# Patient Record
Sex: FEMALE | Race: WHITE | NOT HISPANIC OR LATINO | Employment: UNEMPLOYED | URBAN - METROPOLITAN AREA
[De-identification: names, ages, dates, MRNs, and addresses within clinical notes are randomized per-mention and may not be internally consistent; named-entity substitution may affect disease eponyms.]

---

## 2018-02-28 ENCOUNTER — HOSPITAL ENCOUNTER (EMERGENCY)
Facility: HOSPITAL | Age: 23
Discharge: HOME | End: 2018-02-28
Attending: EMERGENCY MEDICINE
Payer: MEDICAID

## 2018-02-28 VITALS
TEMPERATURE: 99 F | SYSTOLIC BLOOD PRESSURE: 122 MMHG | RESPIRATION RATE: 17 BRPM | HEIGHT: 68 IN | HEART RATE: 87 BPM | BODY MASS INDEX: 28.79 KG/M2 | DIASTOLIC BLOOD PRESSURE: 80 MMHG | OXYGEN SATURATION: 100 % | WEIGHT: 190 LBS

## 2018-02-28 DIAGNOSIS — L03.113 CELLULITIS OF RIGHT ELBOW: Primary | ICD-10-CM

## 2018-02-28 PROCEDURE — S0077 INJECTION, CLINDAMYCIN PHOSP: HCPCS | Performed by: NURSE PRACTITIONER

## 2018-02-28 PROCEDURE — 96372 THER/PROPH/DIAG INJ SC/IM: CPT

## 2018-02-28 PROCEDURE — 99283 EMERGENCY DEPT VISIT LOW MDM: CPT

## 2018-02-28 PROCEDURE — 25000003 PHARM REV CODE 250: Performed by: NURSE PRACTITIONER

## 2018-02-28 RX ORDER — CLINDAMYCIN PHOSPHATE 150 MG/ML
600 INJECTION, SOLUTION INTRAVENOUS
Status: COMPLETED | OUTPATIENT
Start: 2018-02-28 | End: 2018-02-28

## 2018-02-28 RX ORDER — CLINDAMYCIN HYDROCHLORIDE 300 MG/1
300 CAPSULE ORAL 4 TIMES DAILY
Qty: 28 CAPSULE | Refills: 0 | Status: SHIPPED | OUTPATIENT
Start: 2018-02-28 | End: 2018-03-07

## 2018-02-28 RX ADMIN — CLINDAMYCIN PHOSPHATE 600 MG: 150 INJECTION, SOLUTION INTRAMUSCULAR; INTRAVENOUS at 03:02

## 2018-02-28 NOTE — ED PROVIDER NOTES
SCRIBE #1 NOTE: I, Alan Shabazzsakshi Piper, am scribing for, and in the presence of, Dwaine Estrada NP. I have scribed the entire note.      History      Chief Complaint   Patient presents with    Cellulitis     R arm. States she needs treatment to go to rehab.        Review of patient's allergies indicates:  No Known Allergies     HPI   HPI    2/28/2018, 3:03 PM   History obtained from the patient      History of Present Illness: Neha Vázquez is a 22 y.o. female patient with a PMHx of MRSA, IVDA, who presents to the Emergency Department for right forearm pain and swelling which onset gradually 3 days ago. Sxs are constant, worsening, and moderate in severity. Pt reports sxs onset after using IV heroin 3 days ago. Pt reports pain radiates to right upper arm. Pt was seen at [facility] yesterday but had to leave hospital to pickup her kids before she could be treated. There are no mitigating or exacerbating factors noted. Associated sxs include chills.  Pt denies any fever, N/V/D, numbness, shoulder pain, diaphoresis, HA, CP,  and all other sxs at this time. Pt is a daily smoker. No further complaints or concerns at this time.       Arrival mode: Personal vehicle      PCP: [facility]       Past Medical History:  unknown    Past Surgical History:  unknown    Family History:  Family History   Problem Relation Age of Onset    Heart disease Maternal Grandfather        Social History:  Social History     Social History Main Topics    Smoking status: Never Smoker    Smokeless tobacco: unknown    Alcohol use No    Drug use: No    Sexual activity: Yes     Partners: Male       ROS   Review of Systems   Constitutional: Negative for fever.   HENT: Negative for sore throat.    Respiratory: Negative for shortness of breath.    Cardiovascular: Negative for chest pain.   Gastrointestinal: Negative for abdominal pain, diarrhea, nausea and vomiting.   Genitourinary: Negative for dysuria.   Musculoskeletal: Negative  "for back pain.        (+) R forearm swelling  (+) R forearm pain   Skin: Positive for color change (erythema to right forearm). Negative for rash and wound.   Neurological: Negative for weakness.   Hematological: Does not bruise/bleed easily.       Physical Exam      Initial Vitals [02/28/18 1401]   BP Pulse Resp Temp SpO2   129/65 95 18 98.8 °F (37.1 °C) 99 %      MAP       86.33          Physical Exam  Nursing Notes and Vital Signs Reviewed.  Constitutional: Patient is in no acute distress. Well-developed and well-nourished.  Head: Atraumatic. Normocephalic.  Eyes: PERRL. EOM intact. Conjunctivae are not pale. No scleral icterus.  ENT: Mucous membranes are moist. Oropharynx is clear and symmetric.    Neck: Supple. Full ROM. No lymphadenopathy.  Cardiovascular: Regular rate. Regular rhythm. No murmurs, rubs, or gallops. Distal pulses are 2+ and symmetric.  Pulmonary/Chest: No respiratory distress. Clear to auscultation bilaterally. No wheezing or rales.  Musculoskeletal: Moves all extremities. No calf tenderness.  RUE: 1-2 cm area of induration to ulnar aspect of AC with mild peripheral erythema.  ROM is normal in elbow. Cap refill distally is <2 seconds. Radial pulses are equal and 2+ bilaterally. No motor deficit. No distal sensory deficit.  Skin: Warm and dry.  Neurological:  Alert, awake, and appropriate.  Normal speech.  No acute focal neurological deficits are appreciated.  Psychiatric: Normal affect. Good eye contact. Appropriate in content.    ED Course    Procedures  ED Vital Signs:  Vitals:    02/28/18 1401   BP: 129/65   Pulse: 95   Resp: 18   Temp: 98.8 °F (37.1 °C)   TempSrc: Oral   SpO2: 99%   Weight: 86.2 kg (190 lb)   Height: 5' 8" (1.727 m)              The Emergency Provider reviewed the vital signs and test results, which are outlined above.    ED Discussion     3:14 PM: Discussed plan of treatment with pt. Gave pt all f/u and return to the ED instructions. All questions and concerns were " addressed at this time. Pt understands and agrees to plan as discussed. Pt is stable for discharge.     I discussed wound care precautions with patient and/or family/caretaker; specifically that all wounds have risk of infection despite efforts to cleanse and debride the wound; and there is a risk of an occult foreign body (and thus increased risk of infection) despite a negative examination.  I discussed with patient need to return for any signs of infection, specifically redness, increased pain, fever, drainage of pus, or any concern, immediately.    ED Medication(s):  Medications   clindamycin injection 600 mg (not administered)       New Prescriptions    CLINDAMYCIN (CLEOCIN) 300 MG CAPSULE    Take 1 capsule (300 mg total) by mouth 4 (four) times daily.       Follow-up Information     [facility]. Schedule an appointment as soon as possible for a visit in 2 days.                   Medical Decision Making        Additional MDM:   Smoking Cessation: The patient was counseled on tobacco related  health complications.        Scribe Attestation:   Scribe #1: I performed the above scribed service and the documentation accurately describes the services I performed. I attest to the accuracy of the note.    Attending:   Physician Attestation Statement for Scribe #1: I, Dwaine Estrada NP, personally performed the services described in this documentation, as scribed by Alan Piper, in my presence, and it is both accurate and complete.          Clinical Impression       ICD-10-CM ICD-9-CM   1. Cellulitis of right elbow L03.113 682.3       Disposition:   Disposition: Discharged  Condition: Stable         Dwaine Estrada NP  02/28/18 1517

## 2018-02-28 NOTE — ED NOTES
Pt to ER with c/o infection to R arm secondary to drug use. Pt with discrete erythema. No abscess noted. Pt states she would like to get clean and needs cellulitis treatment prior to entering rehab

## 2019-08-13 ENCOUNTER — HOSPITAL ENCOUNTER (EMERGENCY)
Facility: HOSPITAL | Age: 24
Discharge: HOME | End: 2019-08-13
Attending: EMERGENCY MEDICINE
Payer: MEDICAID

## 2019-08-13 VITALS
DIASTOLIC BLOOD PRESSURE: 96 MMHG | BODY MASS INDEX: 29.11 KG/M2 | RESPIRATION RATE: 20 BRPM | HEART RATE: 93 BPM | HEIGHT: 69 IN | TEMPERATURE: 99 F | SYSTOLIC BLOOD PRESSURE: 134 MMHG | WEIGHT: 196.56 LBS | OXYGEN SATURATION: 98 %

## 2019-08-13 DIAGNOSIS — K75.9 HEPATITIS: Primary | ICD-10-CM

## 2019-08-13 LAB
ALBUMIN SERPL BCP-MCNC: 3 G/DL (ref 3.5–5.2)
ALP SERPL-CCNC: 734 U/L (ref 55–135)
ALT SERPL W/O P-5'-P-CCNC: 656 U/L (ref 10–44)
ANION GAP SERPL CALC-SCNC: 9 MMOL/L (ref 8–16)
AST SERPL-CCNC: 284 U/L (ref 10–40)
B-HCG UR QL: NEGATIVE
BASOPHILS # BLD AUTO: 0.03 K/UL (ref 0–0.2)
BASOPHILS NFR BLD: 0.5 % (ref 0–1.9)
BILIRUB SERPL-MCNC: 3.8 MG/DL (ref 0.1–1)
BILIRUB UR QL STRIP: ABNORMAL
BUN SERPL-MCNC: 8 MG/DL (ref 6–20)
CALCIUM SERPL-MCNC: 9.1 MG/DL (ref 8.7–10.5)
CHLORIDE SERPL-SCNC: 106 MMOL/L (ref 95–110)
CLARITY UR: CLEAR
CO2 SERPL-SCNC: 23 MMOL/L (ref 23–29)
COLOR UR: YELLOW
CREAT SERPL-MCNC: 0.7 MG/DL (ref 0.5–1.4)
DACRYOCYTES BLD QL SMEAR: ABNORMAL
DIFFERENTIAL METHOD: ABNORMAL
EOSINOPHIL # BLD AUTO: 0.1 K/UL (ref 0–0.5)
EOSINOPHIL NFR BLD: 1.1 % (ref 0–8)
ERYTHROCYTE [DISTWIDTH] IN BLOOD BY AUTOMATED COUNT: 17.2 % (ref 11.5–14.5)
EST. GFR  (AFRICAN AMERICAN): >60 ML/MIN/1.73 M^2
EST. GFR  (NON AFRICAN AMERICAN): >60 ML/MIN/1.73 M^2
GLUCOSE SERPL-MCNC: 106 MG/DL (ref 70–110)
GLUCOSE UR QL STRIP: NEGATIVE
HCT VFR BLD AUTO: 40.1 % (ref 37–48.5)
HGB BLD-MCNC: 13.3 G/DL (ref 12–16)
HGB UR QL STRIP: NEGATIVE
INR PPP: 0.9 (ref 0.8–1.2)
KETONES UR QL STRIP: NEGATIVE
LEUKOCYTE ESTERASE UR QL STRIP: NEGATIVE
LYMPHOCYTES # BLD AUTO: 2.3 K/UL (ref 1–4.8)
LYMPHOCYTES NFR BLD: 36.2 % (ref 18–48)
MCH RBC QN AUTO: 26.9 PG (ref 27–31)
MCHC RBC AUTO-ENTMCNC: 33.2 G/DL (ref 32–36)
MCV RBC AUTO: 81 FL (ref 82–98)
MONOCYTES # BLD AUTO: 0.7 K/UL (ref 0.3–1)
MONOCYTES NFR BLD: 10.7 % (ref 4–15)
NEUTROPHILS # BLD AUTO: 3.3 K/UL (ref 1.8–7.7)
NEUTROPHILS NFR BLD: 52.6 % (ref 38–73)
NITRITE UR QL STRIP: NEGATIVE
PH UR STRIP: 7 [PH] (ref 5–8)
PLATELET # BLD AUTO: 216 K/UL (ref 150–350)
PMV BLD AUTO: 8.9 FL (ref 9.2–12.9)
POIKILOCYTOSIS BLD QL SMEAR: SLIGHT
POTASSIUM SERPL-SCNC: 3.7 MMOL/L (ref 3.5–5.1)
PROT SERPL-MCNC: 8.1 G/DL (ref 6–8.4)
PROT UR QL STRIP: ABNORMAL
PROTHROMBIN TIME: 10.1 SEC (ref 9–12.5)
RBC # BLD AUTO: 4.95 M/UL (ref 4–5.4)
SODIUM SERPL-SCNC: 138 MMOL/L (ref 136–145)
SP GR UR STRIP: 1.01 (ref 1–1.03)
STOMATOCYTES BLD QL SMEAR: PRESENT
TARGETS BLD QL SMEAR: ABNORMAL
URN SPEC COLLECT METH UR: ABNORMAL
UROBILINOGEN UR STRIP-ACNC: 1 EU/DL
WBC # BLD AUTO: 6.35 K/UL (ref 3.9–12.7)

## 2019-08-13 PROCEDURE — 85025 COMPLETE CBC W/AUTO DIFF WBC: CPT

## 2019-08-13 PROCEDURE — 80053 COMPREHEN METABOLIC PANEL: CPT

## 2019-08-13 PROCEDURE — 81003 URINALYSIS AUTO W/O SCOPE: CPT

## 2019-08-13 PROCEDURE — 81025 URINE PREGNANCY TEST: CPT

## 2019-08-13 PROCEDURE — 85610 PROTHROMBIN TIME: CPT

## 2019-08-13 PROCEDURE — 99283 EMERGENCY DEPT VISIT LOW MDM: CPT

## 2019-08-13 NOTE — ED PROVIDER NOTES
"Encounter Date: 8/13/2019       History     Chief Complaint   Patient presents with    Jaundice     Pt states, "I have Hepatitis C and my eyes are yellow and my pee is dark orange."     23 year old female with complaint of eyes turning yellow over the past few days. No vomiting.  No diarrhea. No fever or chills. No abdominal pain.  History of untreated HCV secondary to IV drug use.         Review of patient's allergies indicates:  No Known Allergies  Past Medical History:   Diagnosis Date    HCV (hepatitis C virus)     IV drug abuse      History reviewed. No pertinent surgical history.  Family History   Problem Relation Age of Onset    Heart disease Maternal Grandfather      Social History     Tobacco Use    Smoking status: Never Smoker    Smokeless tobacco: Never Used   Substance Use Topics    Alcohol use: No    Drug use: Yes     Review of Systems   Constitutional: Negative for fever.   HENT: Negative for sore throat.    Respiratory: Negative for shortness of breath.    Cardiovascular: Negative for chest pain.   Gastrointestinal: Negative for nausea.   Genitourinary: Negative for dysuria.   Musculoskeletal: Negative for back pain.   Skin: Positive for rash.   Neurological: Negative for weakness.   Hematological: Does not bruise/bleed easily.       Physical Exam     Initial Vitals [08/13/19 0937]   BP Pulse Resp Temp SpO2   (!) 134/96 93 20 98.7 °F (37.1 °C) 98 %      MAP       --         Physical Exam    Nursing note and vitals reviewed.  Constitutional: She appears well-developed and well-nourished.   HENT:   Head: Normocephalic and atraumatic.   Eyes: EOM are normal. Pupils are equal, round, and reactive to light.   Mild scleral icterus   Neck: Normal range of motion. Neck supple.   Cardiovascular: Normal rate, regular rhythm, normal heart sounds and intact distal pulses.   Pulmonary/Chest: Breath sounds normal.   Abdominal: Soft. There is no tenderness. There is no rebound and no guarding. "   Musculoskeletal: Normal range of motion.   Neurological: She is alert and oriented to person, place, and time. She has normal strength and normal reflexes.   Skin: Skin is warm and dry. Capillary refill takes less than 2 seconds.   Psychiatric: She has a normal mood and affect. Her behavior is normal. Thought content normal.         ED Course   Procedures  Labs Reviewed   CBC W/ AUTO DIFFERENTIAL - Abnormal; Notable for the following components:       Result Value    Mean Corpuscular Volume 81 (*)     Mean Corpuscular Hemoglobin 26.9 (*)     RDW 17.2 (*)     MPV 8.9 (*)     All other components within normal limits   COMPREHENSIVE METABOLIC PANEL - Abnormal; Notable for the following components:    Albumin 3.0 (*)     Total Bilirubin 3.8 (*)     Alkaline Phosphatase 734 (*)      (*)      (*)     All other components within normal limits   URINALYSIS - Abnormal; Notable for the following components:    Protein, UA Trace (*)     Bilirubin (UA) 2+ (*)     All other components within normal limits   PROTIME-INR   PREGNANCY TEST, URINE RAPID          Imaging Results    None              Labs Reviewed   CBC W/ AUTO DIFFERENTIAL - Abnormal; Notable for the following components:       Result Value    Mean Corpuscular Volume 81 (*)     Mean Corpuscular Hemoglobin 26.9 (*)     RDW 17.2 (*)     MPV 8.9 (*)     All other components within normal limits   COMPREHENSIVE METABOLIC PANEL - Abnormal; Notable for the following components:    Albumin 3.0 (*)     Total Bilirubin 3.8 (*)     Alkaline Phosphatase 734 (*)      (*)      (*)     All other components within normal limits   URINALYSIS - Abnormal; Notable for the following components:    Protein, UA Trace (*)     Bilirubin (UA) 2+ (*)     All other components within normal limits   PROTIME-INR   PREGNANCY TEST, URINE RAPID           11:20 AM  Pt feeling well, will have pt follow up with GI for further evaluation               Clinical Impression:        ICD-10-CM ICD-9-CM   1. Hepatitis K75.9 573.3                                Dwaine Estrada NP  08/13/19 1122       Dwaine Estrada NP  08/13/19 1122

## 2019-08-13 NOTE — ED NOTES
"Patient identifiers verified and correct for Neha Vázquez.    Patient presented to the ED with c/o dark colored urine for the last few days. Patient is hep c positive and a IV drug user. Patient reports that she got out of treatment 2 weeks ago, but has continued to use drugs. Patient reports that her urine started getting "brown" about a week ago. Patient denies any other issues at this time     LOC: The patient is awake, alert and aware of environment with an appropriate affect, the patient is oriented x 3 and speaking appropriately.  APPEARANCE: Patient resting comfortably and in no acute distress, patient is clean and well groomed, patient's clothing is properly fastened.  HEENT: WNL   SKIN: The skin is warm and dry, color consistent with ethnicity, patient has normal skin turgor and moist mucus membranes, skin intact, no breakdown or bruising noted.  MUSCULOSKELETAL: Patient moving all extremities spontaneously.   RESPIRATORY: Airway is open and patent, respirations are spontaneous, and unlabored. Breath sounds are clear.   CARDIAC: Patient has a normal rate, no periphreal edema noted, capillary refill < 3 seconds.   ABDOMEN: Soft and non tender to palpation. No distention noted.   : Normal urinary patterns. + dark "brown" urine     "

## 2019-11-05 ENCOUNTER — HOSPITAL ENCOUNTER (OUTPATIENT)
Facility: HOSPITAL | Age: 24
Discharge: HOME | End: 2019-11-08
Attending: EMERGENCY MEDICINE | Admitting: EMERGENCY MEDICINE
Payer: MEDICAID

## 2019-11-05 DIAGNOSIS — E87.20 METABOLIC ACIDOSIS: ICD-10-CM

## 2019-11-05 DIAGNOSIS — N17.9 AKI (ACUTE KIDNEY INJURY): ICD-10-CM

## 2019-11-05 DIAGNOSIS — Z71.89 ENCOUNTER FOR MEDICATION REVIEW AND COUNSELING: ICD-10-CM

## 2019-11-05 DIAGNOSIS — N20.1 URETEROLITHIASIS: Primary | ICD-10-CM

## 2019-11-05 DIAGNOSIS — N14.0 ANALGESIC NEPHROPATHY: ICD-10-CM

## 2019-11-05 DIAGNOSIS — N13.30 HYDRONEPHROSIS, UNSPECIFIED HYDRONEPHROSIS TYPE: ICD-10-CM

## 2019-11-05 PROBLEM — D72.829 LEUKOCYTOSIS: Status: ACTIVE | Noted: 2019-11-05

## 2019-11-05 LAB
ALBUMIN SERPL BCP-MCNC: 3.2 G/DL (ref 3.5–5.2)
ALP SERPL-CCNC: 66 U/L (ref 55–135)
ALT SERPL W/O P-5'-P-CCNC: 15 U/L (ref 10–44)
ANION GAP SERPL CALC-SCNC: 12 MMOL/L (ref 8–16)
AST SERPL-CCNC: 19 U/L (ref 10–40)
B-HCG UR QL: NEGATIVE
BACTERIA #/AREA URNS HPF: ABNORMAL /HPF
BASOPHILS # BLD AUTO: 0.04 K/UL (ref 0–0.2)
BASOPHILS NFR BLD: 0.3 % (ref 0–1.9)
BILIRUB SERPL-MCNC: 0.4 MG/DL (ref 0.1–1)
BILIRUB UR QL STRIP: NEGATIVE
BILIRUB UR QL STRIP: NEGATIVE
BUN SERPL-MCNC: 31 MG/DL (ref 6–20)
CALCIUM SERPL-MCNC: 8.9 MG/DL (ref 8.7–10.5)
CHLORIDE SERPL-SCNC: 105 MMOL/L (ref 95–110)
CLARITY UR: CLEAR
CLARITY UR: CLEAR
CO2 SERPL-SCNC: 20 MMOL/L (ref 23–29)
COLOR UR: YELLOW
COLOR UR: YELLOW
CREAT SERPL-MCNC: 2.4 MG/DL (ref 0.5–1.4)
CREAT UR-MCNC: 57.7 MG/DL (ref 15–325)
DIFFERENTIAL METHOD: ABNORMAL
EOSINOPHIL # BLD AUTO: 0.3 K/UL (ref 0–0.5)
EOSINOPHIL NFR BLD: 2 % (ref 0–8)
ERYTHROCYTE [DISTWIDTH] IN BLOOD BY AUTOMATED COUNT: 14.3 % (ref 11.5–14.5)
EST. GFR  (AFRICAN AMERICAN): 32 ML/MIN/1.73 M^2
EST. GFR  (NON AFRICAN AMERICAN): 28 ML/MIN/1.73 M^2
GLUCOSE SERPL-MCNC: 108 MG/DL (ref 70–110)
GLUCOSE UR QL STRIP: NEGATIVE
GLUCOSE UR QL STRIP: NEGATIVE
HCT VFR BLD AUTO: 37.5 % (ref 37–48.5)
HGB BLD-MCNC: 12.3 G/DL (ref 12–16)
HGB UR QL STRIP: ABNORMAL
HGB UR QL STRIP: ABNORMAL
HIV 1+2 AB+HIV1 P24 AG SERPL QL IA: NEGATIVE
IMM GRANULOCYTES # BLD AUTO: 0.06 K/UL (ref 0–0.04)
IMM GRANULOCYTES NFR BLD AUTO: 0.5 % (ref 0–0.5)
KETONES UR QL STRIP: ABNORMAL
KETONES UR QL STRIP: NEGATIVE
LEUKOCYTE ESTERASE UR QL STRIP: ABNORMAL
LEUKOCYTE ESTERASE UR QL STRIP: NEGATIVE
LYMPHOCYTES # BLD AUTO: 1.3 K/UL (ref 1–4.8)
LYMPHOCYTES NFR BLD: 10.1 % (ref 18–48)
MCH RBC QN AUTO: 27.9 PG (ref 27–31)
MCHC RBC AUTO-ENTMCNC: 32.8 G/DL (ref 32–36)
MCV RBC AUTO: 85 FL (ref 82–98)
MICROSCOPIC COMMENT: ABNORMAL
MICROSCOPIC COMMENT: ABNORMAL
MONOCYTES # BLD AUTO: 1 K/UL (ref 0.3–1)
MONOCYTES NFR BLD: 7.6 % (ref 4–15)
NEUTROPHILS # BLD AUTO: 10.2 K/UL (ref 1.8–7.7)
NEUTROPHILS NFR BLD: 79.5 % (ref 38–73)
NITRITE UR QL STRIP: NEGATIVE
NITRITE UR QL STRIP: NEGATIVE
NRBC BLD-RTO: 0 /100 WBC
PH UR STRIP: 6 [PH] (ref 5–8)
PH UR STRIP: 6 [PH] (ref 5–8)
PLATELET # BLD AUTO: 197 K/UL (ref 150–350)
PMV BLD AUTO: 9.1 FL (ref 9.2–12.9)
POTASSIUM SERPL-SCNC: 4.2 MMOL/L (ref 3.5–5.1)
PROT SERPL-MCNC: 7.4 G/DL (ref 6–8.4)
PROT UR QL STRIP: NEGATIVE
PROT UR QL STRIP: NEGATIVE
PTH-INTACT SERPL-MCNC: 95.2 PG/ML (ref 9–77)
RBC # BLD AUTO: 4.41 M/UL (ref 4–5.4)
RBC #/AREA URNS HPF: 11 /HPF (ref 0–4)
RBC #/AREA URNS HPF: 8 /HPF (ref 0–4)
SODIUM SERPL-SCNC: 137 MMOL/L (ref 136–145)
SODIUM UR-SCNC: 103 MMOL/L (ref 20–250)
SP GR UR STRIP: 1.01 (ref 1–1.03)
SP GR UR STRIP: 1.02 (ref 1–1.03)
SQUAMOUS #/AREA URNS HPF: 10 /HPF
SQUAMOUS #/AREA URNS HPF: 5 /HPF
URN SPEC COLLECT METH UR: ABNORMAL
URN SPEC COLLECT METH UR: ABNORMAL
UROBILINOGEN UR STRIP-ACNC: NEGATIVE EU/DL
UROBILINOGEN UR STRIP-ACNC: NEGATIVE EU/DL
WBC # BLD AUTO: 12.79 K/UL (ref 3.9–12.7)
WBC #/AREA URNS HPF: 6 /HPF (ref 0–5)
WBC #/AREA URNS HPF: 8 /HPF (ref 0–5)
WBC CLUMPS URNS QL MICRO: ABNORMAL

## 2019-11-05 PROCEDURE — 82570 ASSAY OF URINE CREATININE: CPT

## 2019-11-05 PROCEDURE — 87086 URINE CULTURE/COLONY COUNT: CPT

## 2019-11-05 PROCEDURE — 96361 HYDRATE IV INFUSION ADD-ON: CPT

## 2019-11-05 PROCEDURE — 80053 COMPREHEN METABOLIC PANEL: CPT

## 2019-11-05 PROCEDURE — 63600175 PHARM REV CODE 636 W HCPCS: Performed by: EMERGENCY MEDICINE

## 2019-11-05 PROCEDURE — 85025 COMPLETE CBC W/AUTO DIFF WBC: CPT

## 2019-11-05 PROCEDURE — G0378 HOSPITAL OBSERVATION PER HR: HCPCS

## 2019-11-05 PROCEDURE — 99291 CRITICAL CARE FIRST HOUR: CPT | Mod: 25

## 2019-11-05 PROCEDURE — 25000003 PHARM REV CODE 250: Performed by: INTERNAL MEDICINE

## 2019-11-05 PROCEDURE — 83970 ASSAY OF PARATHORMONE: CPT

## 2019-11-05 PROCEDURE — 96375 TX/PRO/DX INJ NEW DRUG ADDON: CPT

## 2019-11-05 PROCEDURE — 25000003 PHARM REV CODE 250: Performed by: EMERGENCY MEDICINE

## 2019-11-05 PROCEDURE — 99205 OFFICE O/P NEW HI 60 MIN: CPT | Mod: ,,, | Performed by: INTERNAL MEDICINE

## 2019-11-05 PROCEDURE — 81000 URINALYSIS NONAUTO W/SCOPE: CPT

## 2019-11-05 PROCEDURE — 99205 PR OFFICE/OUTPT VISIT, NEW, LEVL V, 60-74 MIN: ICD-10-PCS | Mod: ,,, | Performed by: INTERNAL MEDICINE

## 2019-11-05 PROCEDURE — 63600175 PHARM REV CODE 636 W HCPCS: Performed by: NURSE PRACTITIONER

## 2019-11-05 PROCEDURE — 81025 URINE PREGNANCY TEST: CPT

## 2019-11-05 PROCEDURE — 84300 ASSAY OF URINE SODIUM: CPT

## 2019-11-05 PROCEDURE — 86703 HIV-1/HIV-2 1 RESULT ANTBDY: CPT

## 2019-11-05 PROCEDURE — 96365 THER/PROPH/DIAG IV INF INIT: CPT

## 2019-11-05 PROCEDURE — 87040 BLOOD CULTURE FOR BACTERIA: CPT

## 2019-11-05 PROCEDURE — 81000 URINALYSIS NONAUTO W/SCOPE: CPT | Mod: 91

## 2019-11-05 RX ORDER — KETOROLAC TROMETHAMINE 10 MG/1
10 TABLET, FILM COATED ORAL EVERY 6 HOURS
Status: ON HOLD | COMMUNITY
End: 2019-11-08 | Stop reason: HOSPADM

## 2019-11-05 RX ORDER — SODIUM CHLORIDE 9 MG/ML
INJECTION, SOLUTION INTRAVENOUS CONTINUOUS
Status: DISCONTINUED | OUTPATIENT
Start: 2019-11-05 | End: 2019-11-08 | Stop reason: HOSPADM

## 2019-11-05 RX ORDER — ACETAMINOPHEN 500 MG
1000 TABLET ORAL EVERY 6 HOURS PRN
Status: DISCONTINUED | OUTPATIENT
Start: 2019-11-05 | End: 2019-11-08 | Stop reason: HOSPADM

## 2019-11-05 RX ORDER — HYDROCODONE BITARTRATE AND ACETAMINOPHEN 7.5; 325 MG/1; MG/1
1 TABLET ORAL EVERY 6 HOURS PRN
Status: DISCONTINUED | OUTPATIENT
Start: 2019-11-05 | End: 2019-11-05

## 2019-11-05 RX ORDER — ONDANSETRON 2 MG/ML
4 INJECTION INTRAMUSCULAR; INTRAVENOUS
Status: COMPLETED | OUTPATIENT
Start: 2019-11-05 | End: 2019-11-05

## 2019-11-05 RX ORDER — ACETAMINOPHEN 325 MG/1
650 TABLET ORAL
Status: COMPLETED | OUTPATIENT
Start: 2019-11-05 | End: 2019-11-05

## 2019-11-05 RX ORDER — HYDROCODONE BITARTRATE AND ACETAMINOPHEN 10; 325 MG/1; MG/1
1 TABLET ORAL EVERY 6 HOURS PRN
Status: DISCONTINUED | OUTPATIENT
Start: 2019-11-05 | End: 2019-11-05

## 2019-11-05 RX ORDER — NALTREXONE HYDROCHLORIDE 50 MG/1
50 TABLET, FILM COATED ORAL DAILY
Status: ON HOLD | COMMUNITY
End: 2020-08-06 | Stop reason: HOSPADM

## 2019-11-05 RX ORDER — TAMSULOSIN HYDROCHLORIDE 0.4 MG/1
0.4 CAPSULE ORAL DAILY
Status: DISCONTINUED | OUTPATIENT
Start: 2019-11-05 | End: 2019-11-08 | Stop reason: HOSPADM

## 2019-11-05 RX ORDER — ONDANSETRON 2 MG/ML
4 INJECTION INTRAMUSCULAR; INTRAVENOUS EVERY 8 HOURS PRN
Status: DISCONTINUED | OUTPATIENT
Start: 2019-11-05 | End: 2019-11-08 | Stop reason: HOSPADM

## 2019-11-05 RX ORDER — MORPHINE SULFATE 4 MG/ML
4 INJECTION, SOLUTION INTRAMUSCULAR; INTRAVENOUS
Status: DISCONTINUED | OUTPATIENT
Start: 2019-11-05 | End: 2019-11-05

## 2019-11-05 RX ADMIN — SODIUM CHLORIDE 1000 ML: 0.9 INJECTION, SOLUTION INTRAVENOUS at 10:11

## 2019-11-05 RX ADMIN — SODIUM CHLORIDE 1000 ML: 0.9 INJECTION, SOLUTION INTRAVENOUS at 12:11

## 2019-11-05 RX ADMIN — TAMSULOSIN HYDROCHLORIDE 0.4 MG: 0.4 CAPSULE ORAL at 02:11

## 2019-11-05 RX ADMIN — ACETAMINOPHEN 650 MG: 325 TABLET ORAL at 12:11

## 2019-11-05 RX ADMIN — CEFTRIAXONE 1 G: 1 INJECTION, SOLUTION INTRAVENOUS at 02:11

## 2019-11-05 RX ADMIN — SODIUM CHLORIDE: 0.9 INJECTION, SOLUTION INTRAVENOUS at 08:11

## 2019-11-05 RX ADMIN — SODIUM CHLORIDE: 0.9 INJECTION, SOLUTION INTRAVENOUS at 03:11

## 2019-11-05 RX ADMIN — ONDANSETRON 4 MG: 2 INJECTION INTRAMUSCULAR; INTRAVENOUS at 12:11

## 2019-11-05 NOTE — ASSESSMENT & PLAN NOTE
JAZMINE due to toradol use  Acute analgesic nephropathy  The kidney stone is unilateral and has no relation to JAZMINE  K normal  Metabolic acidosis, mild  JAZMINE should resolve with IVF's and after stopping toradol and with avoiding other NSAIDs.  Pt was advised about risk of kidney damage with NSAIds

## 2019-11-05 NOTE — ED TRIAGE NOTES
22 y/o F presents to ED c/o right flank pain, worsening.  Pt reports treated for same two days ago.

## 2019-11-05 NOTE — SUBJECTIVE & OBJECTIVE
Past Medical History:   Diagnosis Date    HCV (hepatitis C virus)     IV drug abuse        History reviewed. No pertinent surgical history.    Review of patient's allergies indicates:  No Known Allergies  Current Facility-Administered Medications   Medication Frequency    0.9%  NaCl infusion Continuous    cefTRIAXone (ROCEPHIN) 1 g in dextrose 5 % 50 mL IVPB ED 1 Time    tamsulosin 24 hr capsule 0.4 mg Daily     Current Outpatient Medications   Medication    ketorolac (TORADOL) 10 mg tablet    naltrexone (DEPADE) 50 mg tablet     Family History     Problem Relation (Age of Onset)    Heart disease Maternal Grandfather        Tobacco Use    Smoking status: Never Smoker    Smokeless tobacco: Never Used   Substance and Sexual Activity    Alcohol use: No    Drug use: Yes    Sexual activity: Yes     Partners: Male     Review of Systems   Constitutional: Negative for fever.   HENT: Negative.    Respiratory: Negative.    Cardiovascular: Negative.    Gastrointestinal: Negative.    Genitourinary: Positive for dysuria, flank pain and urgency.   Neurological: Negative.    Psychiatric/Behavioral: Negative.      Objective:     Vital Signs (Most Recent):  Temp: 99.1 °F (37.3 °C) (11/05/19 0928)  Pulse: 94 (11/05/19 1302)  Resp: 18 (11/05/19 1059)  BP: 106/61 (11/05/19 1302)  SpO2: 99 % (11/05/19 1302)  O2 Device (Oxygen Therapy): room air (11/05/19 1059) Vital Signs (24h Range):  Temp:  [99.1 °F (37.3 °C)] 99.1 °F (37.3 °C)  Pulse:  [92-98] 94  Resp:  [18-20] 18  SpO2:  [97 %-100 %] 99 %  BP: (102-149)/(58-76) 106/61     Weight: 105.2 kg (231 lb 14.8 oz) (11/05/19 0926)  Body mass index is 38.59 kg/m².  Body surface area is 2.2 meters squared.    No intake/output data recorded.    Physical Exam   Constitutional: She is oriented to person, place, and time. She appears well-developed and well-nourished. No distress.   HENT:   Head: Normocephalic and atraumatic.   Neck: No JVD present.   Cardiovascular: Normal rate,  regular rhythm and normal heart sounds. Exam reveals no friction rub.   Pulmonary/Chest: Effort normal and breath sounds normal. No stridor. No respiratory distress. She has no wheezes. She has no rales.   Abdominal: Soft. Bowel sounds are normal. She exhibits no distension. There is no tenderness. There is no guarding.   Musculoskeletal: She exhibits no edema.   Neurological: She is alert and oriented to person, place, and time.   Skin: Skin is warm and dry.   Psychiatric: She has a normal mood and affect. Her behavior is normal. Thought content normal.   Nursing note and vitals reviewed.      Significant Labs: reviewed  BMP  Lab Results   Component Value Date     11/05/2019    K 4.2 11/05/2019     11/05/2019    CO2 20 (L) 11/05/2019    BUN 31 (H) 11/05/2019    CREATININE 2.4 (H) 11/05/2019    CALCIUM 8.9 11/05/2019    ANIONGAP 12 11/05/2019    ESTGFRAFRICA 32 (A) 11/05/2019    EGFRNONAA 28 (A) 11/05/2019     Lab Results   Component Value Date    WBC 12.79 (H) 11/05/2019    HGB 12.3 11/05/2019    HCT 37.5 11/05/2019    MCV 85 11/05/2019     11/05/2019     U/a: 2+ blood, no protein, no nitrites, no leukoctes    Significant Imaging: reviewed CT abd: 5 x 5 mm right distal ureteral calculus resulting in mild right hydronephrosis.

## 2019-11-05 NOTE — ED NOTES
2x failed attempts at getting 2nd set of blood cultures.Patient took to radiology will try again after she comes back. Nurse notified.

## 2019-11-05 NOTE — HPI
Pt was seen and examined. Chart and h/o reviewed. Pt is a 22 y/o female who presented to ER for right sided flank pain. Pt has a h/o of nephrolithiasis x 5 years. Noted that she has a s Cr is 2.4, was normal 2 months ago. The stones have occurred in both left and right side. She previously saw a nephrologist and neglected to complete the w/u. She has never the stones, the stones have never been analyzed. Pt presented to [facility] ER 2 days ago for right sided pain, a stone was identified, per pt, in the right side. Pt was give IVF's, toradol injection, and was discharged home with a Rx for toradol tablets 10 mg bid, which she has taken in the past 2 days. The pain is in the right side, extending to the groin. Pt denies fever, no cloudy urine. No pain in left side. Pt has significant dietary risk factors for stone formation, including very salty diet, excessive meat intake, and not drinking adequate water. Discussed with urology.

## 2019-11-05 NOTE — CONSULTS
[facility]  Nephrology  Consult Note    Patient Name: Neha Vázquez  MRN: [id number]  Admission Date: 11/5/2019  Hospital Length of Stay: 0 days  Attending Provider: Rah Lopez,*   Primary Care Physician: [facility]  Principal Problem: nephrolithiasis and JAZMINE    Referring physician: Dr. Lopez   Reason for consult: JAZMINE and kidney stone    Consults  Subjective:     HPI: Pt was seen and examined. Chart and h/o reviewed. Pt is a 22 y/o female who presented to ER for right sided flank pain. Pt has a h/o of nephrolithiasis x 5 years. Noted that she has a s Cr is 2.4, was normal 2 months ago. The stones have occurred in both left and right side. She previously saw a nephrologist and neglected to complete the w/u. She has never the stones, the stones have never been analyzed. Pt presented to [facility] ER 2 days ago for right sided pain, a stone was identified, per pt, in the right side. Pt was give IVF's, toradol injection, and was discharged home with a Rx for toradol tablets 10 mg bid, which she has taken in the past 2 days. The pain is in the right side, extending to the groin. Pt denies fever, no cloudy urine. No pain in left side. Reports sx's of dysuria and urgency. Pt has significant dietary risk factors for stone formation, including very salty diet, excessive meat intake, and not drinking adequate water. Discussed with urology.     Past Medical History:   Diagnosis Date    HCV (hepatitis C virus)     IV drug abuse        History reviewed. No pertinent surgical history.    Review of patient's allergies indicates:  No Known Allergies  Current Facility-Administered Medications   Medication Frequency    0.9%  NaCl infusion Continuous    cefTRIAXone (ROCEPHIN) 1 g in dextrose 5 % 50 mL IVPB ED 1 Time    tamsulosin 24 hr capsule 0.4 mg Daily     Current Outpatient Medications   Medication    ketorolac (TORADOL) 10 mg tablet    naltrexone (DEPADE) 50 mg tablet      Family History     Problem Relation (Age of Onset)    Heart disease Maternal Grandfather        Tobacco Use    Smoking status: Never Smoker    Smokeless tobacco: Never Used   Substance and Sexual Activity    Alcohol use: No    Drug use: Yes    Sexual activity: Yes     Partners: Male     Review of Systems   Constitutional: Negative for fever.   HENT: Negative.    Respiratory: Negative.    Cardiovascular: Negative.    Gastrointestinal: Negative.    Genitourinary: Positive for dysuria, flank pain and urgency.   Neurological: Negative.    Psychiatric/Behavioral: Negative.      Objective:     Vital Signs (Most Recent):  Temp: 99.1 °F (37.3 °C) (11/05/19 0928)  Pulse: 94 (11/05/19 1302)  Resp: 18 (11/05/19 1059)  BP: 106/61 (11/05/19 1302)  SpO2: 99 % (11/05/19 1302)  O2 Device (Oxygen Therapy): room air (11/05/19 1059) Vital Signs (24h Range):  Temp:  [99.1 °F (37.3 °C)] 99.1 °F (37.3 °C)  Pulse:  [92-98] 94  Resp:  [18-20] 18  SpO2:  [97 %-100 %] 99 %  BP: (102-149)/(58-76) 106/61     Weight: 105.2 kg (231 lb 14.8 oz) (11/05/19 0926)  Body mass index is 38.59 kg/m².  Body surface area is 2.2 meters squared.    No intake/output data recorded.    Physical Exam   Constitutional: She is oriented to person, place, and time. She appears well-developed and well-nourished. No distress.   HENT:   Head: Normocephalic and atraumatic.   Neck: No JVD present.   Cardiovascular: Normal rate, regular rhythm and normal heart sounds. Exam reveals no friction rub.   Pulmonary/Chest: Effort normal and breath sounds normal. No stridor. No respiratory distress. She has no wheezes. She has no rales.   Abdominal: Soft. Bowel sounds are normal. She exhibits no distension. There is no tenderness. There is no guarding.   Musculoskeletal: She exhibits no edema.   Neurological: She is alert and oriented to person, place, and time.   Skin: Skin is warm and dry.   Psychiatric: She has a normal mood and affect. Her behavior is normal. Thought  content normal.   Nursing note and vitals reviewed.      Significant Labs: reviewed  BMP  Lab Results   Component Value Date     11/05/2019    K 4.2 11/05/2019     11/05/2019    CO2 20 (L) 11/05/2019    BUN 31 (H) 11/05/2019    CREATININE 2.4 (H) 11/05/2019    CALCIUM 8.9 11/05/2019    ANIONGAP 12 11/05/2019    ESTGFRAFRICA 32 (A) 11/05/2019    EGFRNONAA 28 (A) 11/05/2019     Lab Results   Component Value Date    WBC 12.79 (H) 11/05/2019    HGB 12.3 11/05/2019    HCT 37.5 11/05/2019    MCV 85 11/05/2019     11/05/2019     U/a: 2+ blood, no protein, no nitrites, no leukoctes    Significant Imaging: reviewed CT abd: 5 x 5 mm right distal ureteral calculus resulting in mild right hydronephrosis.      Assessment/Plan:   24 y/o female with JAZMINE and a kidney stone:    JAZMINE (acute kidney injury)  JAZMINE due to toradol use  Acute analgesic nephropathy  The kidney stone is unilateral and has no relation to JAZMINE  K normal  Metabolic acidosis, mild  JAZMINE should resolve with IVF's and after stopping toradol and with avoiding other NSAIDs.  Pt was advised about risk of kidney damage with NSAIds    Ureterolithiasis  5 year h/o of kidney stones  No prior w/u  Has dietary risk factors for both Ca oxalate and uric acid stones  No infections suspected, struvite stones not suspected  Has a 5 mm stone in the distal right ureter with mild hydronephrosis  Will order a KUB to differentiate between uric acid and Ca oxalate stones  Continue NS IVF's, received boluses in ER  Start flomax 0.4 mg po qd to dilate the ureter  Non-NSAIDs based pain relief (however, note that pt has a h/o of narcotic abuse and was in drug rehab)  Pt later can do a 24 hour urine collection for stone risk stratification.  Dietary risk factors for nephrolithiasis discussed with pt      Plans and recommendations:  As discussed above  Discussed with pt, opportunity for questions provided  Total time spent 70 minutes including time needed to review the  records, the   patient evaluation, documentation, face-to-face discussion with the patient,   more than 50% of the time was spent on coordination of care and counseling.    Level V visit.      Lizet Yin MD   Nephrology  [facility]

## 2019-11-05 NOTE — ASSESSMENT & PLAN NOTE
5 year h/o of kidney stones  No prior w/u  Has dietary risk factors for both Ca oxalate and uric acid stones  No infections suspected, struvite stones not suspected  Has a 5 mm stone in the distal right ureter with mild hydronephrosis  Will order a KUB to differentiate between uric acid and Ca oxalate stones  Continue NS IVF's, received boluses in ER  Start flomax 0.4 mg po qd to dilate the ureter  Non-NSAIDs based pain relief (however, note that pt has a h/o of narcotic abuse and was in drug rehab)  Pt later can do a 24 hour urine collection for stone risk stratification.  Dietary risk factors for nephrolithiasis discussed with pt

## 2019-11-05 NOTE — ASSESSMENT & PLAN NOTE
WBC 12.79 today in our ED.   At [facility] on 11/3 WBC = 12.8  U/A indicates RBC and + 1 leukocytes  Empiric Rocephin given and urine culture pending  Will hold IV ABX until results of urine culture

## 2019-11-05 NOTE — HPI
Neha Vázquez is a 23 y.o. female patient with a PMHx of Nephrolithiasis, HCV and IV drug abuse (she is in sober living house) who presents to the Emergency Department for evaluation of R sided flank pain which onset gradually several days ago with associated nausea.  She was seen at [facility] 2 nights ago for same symptoms and dx with kidney stone. While at [facility] ED she received IV Toradol and a prescription for Toradol. She presents due to continued pain. She denies fever, chills, vomiting, SOB, hematuria and other symptoms. Vital signs on arrival: normal. Labs find a mild leukocytosis, CO 2 = 20, creatinine 2.4 (11/3 - 1.01) with GFR 28. Renal stone study was significant for mild right hydroureteronephrosis secondary to a 5 x 5 mm right distal ureteral calculus.  Right renal edema and perinephric inflammatory stranding.  Interval development of diffuse left renal cortical scarring.  No evidence of left hydronephrosis.

## 2019-11-05 NOTE — ASSESSMENT & PLAN NOTE
Per Urology - no intervention needed at this time  Continue Flomax  IV fluids and force fluids when at home  Strain urine to monitor for stone  Follow up with Dr. Lam at [facility]

## 2019-11-05 NOTE — ED PROVIDER NOTES
"SCRIBE #1 NOTE: I, Sandrine Dwight, am scribing for, and in the presence of, Rah Lopez MD. I have scribed the entire note.       History     Chief Complaint   Patient presents with    Flank Pain     R flank pain for several days. Seen at [facility] 2 nights ago. Dx with kidney stone. Taking rx with no relief. "it's making my sciatic nerve mess up."      Review of patient's allergies indicates:  No Known Allergies      History of Present Illness     HPI    11/5/2019, 9:43 AM  History obtained from the patient      History of Present Illness: Neha Vázquez is a 23 y.o. female patient with a PMHx of HCV and IV drug abuse who presents to the Emergency Department for evaluation of R sided flank pain which onset gradually several days ago. She was seen at [facility] 2 nights ago for same symptoms and dx with kidney stone. Symptoms are constant and 5/10 in severity. Pain is exacerbated at night. No mitigating factors reported. No associated sxs. Patient denies any fever, chills, abd pain, dysuria, hematuria, urinary frequency/urgency, N/V and all other sxs at this time. No prior Tx. No further complaints or concerns at this time.         Arrival mode: Personal vehicle    PCP: unknown        Past Medical History:  Past Medical History:   Diagnosis Date    HCV (hepatitis C virus)     IV drug abuse        Past Surgical History:  History reviewed. No pertinent family history.       Family History:  Family History   Problem Relation Age of Onset    Heart disease Maternal Grandfather        Social History:  Social History     Tobacco Use    Smoking status: Never Smoker    Smokeless tobacco: Never Used   Substance and Sexual Activity    Alcohol use: No    Drug use: Yes    Sexual activity: Yes     Partners: Male        Review of Systems     Review of Systems   Constitutional: Negative for activity change, appetite change, chills, diaphoresis, fatigue and fever.   HENT: Negative for congestion, ear pain, " nosebleeds, rhinorrhea, sinus pain, sore throat and trouble swallowing.    Eyes: Negative for pain and discharge.   Respiratory: Negative for cough, chest tightness, shortness of breath, wheezing and stridor.    Cardiovascular: Negative for chest pain, palpitations and leg swelling.   Gastrointestinal: Negative for abdominal distention, abdominal pain, blood in stool, constipation, diarrhea, nausea and vomiting.   Genitourinary: Positive for flank pain (R sided). Negative for difficulty urinating, dysuria, frequency, hematuria and urgency.   Musculoskeletal: Negative for arthralgias, back pain, myalgias and neck pain.   Skin: Negative for pallor, rash and wound.   Neurological: Negative for dizziness, syncope, weakness, light-headedness, numbness and headaches.   Hematological: Does not bruise/bleed easily.   Psychiatric/Behavioral: Negative for confusion and self-injury.   All other systems reviewed and are negative.     Physical Exam     Initial Vitals   BP Pulse Resp Temp SpO2   11/05/19 0929 11/05/19 0928 11/05/19 0928 11/05/19 0928 11/05/19 0928   (!) 149/76 98 20 99.1 °F (37.3 °C) 97 %      MAP       --                 Physical Exam  Nursing Notes and Vital Signs Reviewed.  Constitutional: Patient is in no acute distress. Well-developed and well-nourished.  Head: Atraumatic. Normocephalic.  Eyes: PERRL. EOM intact. Conjunctivae are not pale. No scleral icterus.  ENT: Mucous membranes are moist. Oropharynx is clear and symmetric.    Neck: Supple. Full ROM. No lymphadenopathy.  Cardiovascular: Regular rate. Regular rhythm. No murmurs, rubs, or gallops. Distal pulses are 2+ and symmetric.  Pulmonary/Chest: No respiratory distress. Clear to auscultation bilaterally. No wheezing or rales.  Abdominal: Soft and non-distended.  There is no tenderness.  No rebound, guarding, or rigidity. Good bowel sounds.  Genitourinary: No CVA tenderness  Musculoskeletal: Moves all extremities. No obvious deformities. No edema. No  "calf tenderness.  Skin: Warm and dry.  Neurological:  Alert, awake, and appropriate.  Normal speech.  No acute focal neurological deficits are appreciated.  Psychiatric: Normal affect. Good eye contact. Appropriate in content.     ED Course   Critical Care  Date/Time: 11/5/2019 2:10 PM  Performed by: Rah Lopez MD  Authorized by: Rah Lopez MD   Direct patient critical care time: 22 minutes  Additional history critical care time: 9 minutes  Ordering / reviewing critical care time: 18 minutes  Documentation critical care time: 6 minutes  Consulting other physicians critical care time: 5 minutes  Consult with family critical care time: 0 minutes  Other critical care time: 0 minutes  Total critical care time (exclusive of procedural time) : 60 minutes  Critical care time was exclusive of separately billable procedures and treating other patients and teaching time.  Critical care was necessary to treat or prevent imminent or life-threatening deterioration of the following conditions: JAZMINE.  Critical care was time spent personally by me on the following activities: blood draw for specimens, development of treatment plan with patient or surrogate, discussions with consultants, interpretation of cardiac output measurements, examination of patient, evaluation of patient's response to treatment, obtaining history from patient or surrogate, ordering and performing treatments and interventions, ordering and review of radiographic studies, ordering and review of laboratory studies, pulse oximetry, re-evaluation of patient's condition and review of old charts.        ED Vital Signs:  Vitals:    11/05/19 0926 11/05/19 0928 11/05/19 0929 11/05/19 1059   BP:   (!) 149/76 (!) 102/58   Pulse:  98  92   Resp:  20  18   Temp:  99.1 °F (37.3 °C)     TempSrc:  Oral     SpO2:  97%  98%   Weight: 105.2 kg (231 lb 14.8 oz)      Height: 5' 5" (1.651 m)       11/05/19 1206   BP: 118/67   Pulse: 92   Resp:    Temp:  "   TempSrc:    SpO2: 100%   Weight:    Height:        Abnormal Lab Results:  Labs Reviewed   CBC W/ AUTO DIFFERENTIAL - Abnormal; Notable for the following components:       Result Value    WBC 12.79 (*)     MPV 9.1 (*)     Gran # (ANC) 10.2 (*)     Immature Grans (Abs) 0.06 (*)     Gran% 79.5 (*)     Lymph% 10.1 (*)     All other components within normal limits   COMPREHENSIVE METABOLIC PANEL - Abnormal; Notable for the following components:    CO2 20 (*)     BUN, Bld 31 (*)     Creatinine 2.4 (*)     Albumin 3.2 (*)     eGFR if  32 (*)     eGFR if non  28 (*)     All other components within normal limits   URINALYSIS, REFLEX TO URINE CULTURE - Abnormal; Notable for the following components:    Occult Blood UA 2+ (*)     All other components within normal limits    Narrative:     Preferred Collection Type->Urine, Clean Catch   URINALYSIS MICROSCOPIC - Abnormal; Notable for the following components:    RBC, UA 11 (*)     WBC, UA 8 (*)     All other components within normal limits    Narrative:     Preferred Collection Type->Urine, Clean Catch   CULTURE, URINE   CULTURE, BLOOD   CULTURE, BLOOD   HIV 1 / 2 ANTIBODY   PREGNANCY TEST, URINE RAPID   URINALYSIS   SODIUM, URINE, RANDOM   CREATININE, URINE, RANDOM   PTH, INTACT        All Lab Results:  Results for orders placed or performed during the hospital encounter of 11/05/19   HIV 1/2 Ag/Ab (4th Gen)   Result Value Ref Range    HIV 1/2 Ag/Ab Negative Negative   CBC auto differential   Result Value Ref Range    WBC 12.79 (H) 3.90 - 12.70 K/uL    RBC 4.41 4.00 - 5.40 M/uL    Hemoglobin 12.3 12.0 - 16.0 g/dL    Hematocrit 37.5 37.0 - 48.5 %    Mean Corpuscular Volume 85 82 - 98 fL    Mean Corpuscular Hemoglobin 27.9 27.0 - 31.0 pg    Mean Corpuscular Hemoglobin Conc 32.8 32.0 - 36.0 g/dL    RDW 14.3 11.5 - 14.5 %    Platelets 197 150 - 350 K/uL    MPV 9.1 (L) 9.2 - 12.9 fL    Immature Granulocytes 0.5 0.0 - 0.5 %    Gran # (ANC) 10.2 (H)  1.8 - 7.7 K/uL    Immature Grans (Abs) 0.06 (H) 0.00 - 0.04 K/uL    Lymph # 1.3 1.0 - 4.8 K/uL    Mono # 1.0 0.3 - 1.0 K/uL    Eos # 0.3 0.0 - 0.5 K/uL    Baso # 0.04 0.00 - 0.20 K/uL    nRBC 0 0 /100 WBC    Gran% 79.5 (H) 38.0 - 73.0 %    Lymph% 10.1 (L) 18.0 - 48.0 %    Mono% 7.6 4.0 - 15.0 %    Eosinophil% 2.0 0.0 - 8.0 %    Basophil% 0.3 0.0 - 1.9 %    Differential Method Automated    Comprehensive metabolic panel   Result Value Ref Range    Sodium 137 136 - 145 mmol/L    Potassium 4.2 3.5 - 5.1 mmol/L    Chloride 105 95 - 110 mmol/L    CO2 20 (L) 23 - 29 mmol/L    Glucose 108 70 - 110 mg/dL    BUN, Bld 31 (H) 6 - 20 mg/dL    Creatinine 2.4 (H) 0.5 - 1.4 mg/dL    Calcium 8.9 8.7 - 10.5 mg/dL    Total Protein 7.4 6.0 - 8.4 g/dL    Albumin 3.2 (L) 3.5 - 5.2 g/dL    Total Bilirubin 0.4 0.1 - 1.0 mg/dL    Alkaline Phosphatase 66 55 - 135 U/L    AST 19 10 - 40 U/L    ALT 15 10 - 44 U/L    Anion Gap 12 8 - 16 mmol/L    eGFR if African American 32 (A) >60 mL/min/1.73 m^2    eGFR if non African American 28 (A) >60 mL/min/1.73 m^2   Urinalysis, Reflex to Urine Culture Urine, Clean Catch   Result Value Ref Range    Specimen UA Urine, Clean Catch     Color, UA Yellow Yellow, Straw, Destiny    Appearance, UA Clear Clear    pH, UA 6.0 5.0 - 8.0    Specific Gravity, UA 1.020 1.005 - 1.030    Protein, UA Negative Negative    Glucose, UA Negative Negative    Ketones, UA Negative Negative    Bilirubin (UA) Negative Negative    Occult Blood UA 2+ (A) Negative    Nitrite, UA Negative Negative    Urobilinogen, UA Negative <2.0 EU/dL    Leukocytes, UA Negative Negative   Pregnancy, urine rapid   Result Value Ref Range    Preg Test, Ur Negative    Urinalysis Microscopic   Result Value Ref Range    RBC, UA 11 (H) 0 - 4 /hpf    WBC, UA 8 (H) 0 - 5 /hpf    WBC Clumps, UA Rare None-Rare    Squam Epithel, UA 10 /hpf    Microscopic Comment SEE COMMENT          Imaging Results:  Imaging Results          CT Renal Stone Study ABD Pelvis WO  (Final result)  Result time 11/05/19 10:46:55    Final result by Harsh Tomas MD (11/05/19 10:46:55)                 Impression:      5 x 5 mm right distal ureteral calculus resulting in mild right hydronephrosis.    All CT scans at this facility are performed  using dose modulation techniques as appropriate to performed exam including the following:  automated exposure control; adjustment of mA and/or kV according to the patients size (this includes techniques or standardized protocols for targeted exams where dose is matched to indication/reason for exam: i.e. extremities or head);  iterative reconstruction technique.      Electronically signed by: Harsh Tomas MD  Date:    11/05/2019  Time:    10:46             Narrative:    EXAMINATION:  CT RENAL STONE STUDY ABD PELVIS WO    CLINICAL HISTORY:  Flank pain, stone disease suspected;    TECHNIQUE:  Axial CT images performed through the abdomen and pelvis without intravenous contrast. Multiplanar reformats were performed and interpreted.    COMPARISON:  08/03/2016    FINDINGS:  Lung bases are clear.    Mild right hydroureteronephrosis secondary to a 5 x 5 mm right distal ureteral calculus.  Right renal edema and perinephric inflammatory stranding.  Interval development of diffuse left renal cortical scarring.  No evidence of left hydronephrosis.    The liver, spleen, kidneys, adrenal glands, and pancreas have a normal noncontrasted appearance.    The gallbladder is unremarkable.    No free fluid, free air, or inflammatory change.    The bowel is nondistended and within normal limits.  The appendix is within normal limits.    The abdominal aorta is normal in caliber.    The urinary bladder is unremarkable.    No significant osseous abnormality is identified.                                        The Emergency Provider reviewed the vital signs and test results, which are outlined above.     ED Discussion     10:55 AM: Dr. Lopez updated Dr. Rosales (urology) on pt's  case. He recommends Flomax.     1:15 PM: Dr. Lopez discussed the pt's case with Dr. Yin (nephrology) who recommends admit for observation due to low grade temp with 8 wbc on UA.    1:27 PM: Discussed case with CARLOS Lundy ([facility]). Dr. Brewer agrees with current care and management of pt and accepts admission.   Admitting Service: Hospital medicine   Admitting Physician: Daniella  Admit to: obs med surg    1:27 PM: Re-evaluated pt. I have discussed test results, shared treatment plan, and the need for admission with patient and family at bedside. Pt and family express understanding at this time and agree with all information. All questions answered. Pt and family have no further questions or concerns at this time. Pt is ready for admit.    1:30 PM: Dr. Yin evaluated pt at bedside. 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 She got Toradol injection at [facility] and Rx for it at well. Medication has not improved pain.          Medical Decision Making:   Clinical Tests:   Lab Tests: Ordered and Reviewed  Radiological Study: Ordered and Reviewed           ED Medication(s):  Medications   0.9%  NaCl infusion (has no administration in time range)   tamsulosin 24 hr capsule 0.4 mg (has no administration in time range)   cefTRIAXone (ROCEPHIN) 1 g in dextrose 5 % 50 mL IVPB (has no administration in time range)   sodium chloride 0.9% bolus 1,000 mL (0 mLs Intravenous Stopped 11/5/19 1327)   ondansetron injection 4 mg (4 mg Intravenous Given 11/5/19 1253)   sodium chloride 0.9% bolus 1,000 mL (1,000 mLs Intravenous New Bag 11/5/19 1253)   acetaminophen tablet 650 mg (650 mg Oral Given 11/5/19 1253)       Scribe Attestation:   Scribe #1: I performed the above scribed service and the documentation accurately describes the services I performed. I attest to the accuracy of the note.     Attending:   Physician Attestation Statement for Scribe #1: I, Rah Lopez MD,  personally performed the services described in this documentation, as scribed by Sandrine Quintanilla, in my presence, and it is both accurate and complete.           Clinical Impression       ICD-10-CM ICD-9-CM   1. Ureterolithiasis N20.1 592.1   2. JAZMINE (acute kidney injury) N17.9 584.9       Disposition:   Disposition: Placed in Observation  Condition: Fair         Rah Lopez MD  11/07/19 1938

## 2019-11-05 NOTE — ASSESSMENT & PLAN NOTE
Per Nephrology - acute analgesic nephropathy due to Toradol and NSAIDs  IV fluids  Avoid nephrotoxic meds  Repeat chemistries in AM

## 2019-11-06 LAB
ANION GAP SERPL CALC-SCNC: 10 MMOL/L (ref 8–16)
BACTERIA #/AREA URNS HPF: NORMAL /HPF
BILIRUB UR QL STRIP: NEGATIVE
BUN SERPL-MCNC: 21 MG/DL (ref 6–20)
CALCIUM SERPL-MCNC: 8.2 MG/DL (ref 8.7–10.5)
CHLORIDE SERPL-SCNC: 110 MMOL/L (ref 95–110)
CLARITY UR: CLEAR
CO2 SERPL-SCNC: 17 MMOL/L (ref 23–29)
COLOR UR: YELLOW
CREAT SERPL-MCNC: 2 MG/DL (ref 0.5–1.4)
EST. GFR  (AFRICAN AMERICAN): 40 ML/MIN/1.73 M^2
EST. GFR  (NON AFRICAN AMERICAN): 34 ML/MIN/1.73 M^2
GLUCOSE SERPL-MCNC: 83 MG/DL (ref 70–110)
GLUCOSE UR QL STRIP: NEGATIVE
HGB UR QL STRIP: ABNORMAL
KETONES UR QL STRIP: ABNORMAL
LEUKOCYTE ESTERASE UR QL STRIP: ABNORMAL
MICROSCOPIC COMMENT: NORMAL
NITRITE UR QL STRIP: NEGATIVE
PH UR STRIP: 6 [PH] (ref 5–8)
POTASSIUM SERPL-SCNC: 3.9 MMOL/L (ref 3.5–5.1)
PROT UR QL STRIP: NEGATIVE
RBC #/AREA URNS HPF: 3 /HPF (ref 0–4)
SODIUM SERPL-SCNC: 137 MMOL/L (ref 136–145)
SP GR UR STRIP: <=1.005 (ref 1–1.03)
SQUAMOUS #/AREA URNS HPF: 1 /HPF
URN SPEC COLLECT METH UR: ABNORMAL
UROBILINOGEN UR STRIP-ACNC: NEGATIVE EU/DL
WBC #/AREA URNS HPF: 2 /HPF (ref 0–5)

## 2019-11-06 PROCEDURE — G0378 HOSPITAL OBSERVATION PER HR: HCPCS

## 2019-11-06 PROCEDURE — 25000003 PHARM REV CODE 250: Performed by: NURSE PRACTITIONER

## 2019-11-06 PROCEDURE — 96361 HYDRATE IV INFUSION ADD-ON: CPT

## 2019-11-06 PROCEDURE — 63600175 PHARM REV CODE 636 W HCPCS: Performed by: NURSE PRACTITIONER

## 2019-11-06 PROCEDURE — 81000 URINALYSIS NONAUTO W/SCOPE: CPT

## 2019-11-06 PROCEDURE — 36415 COLL VENOUS BLD VENIPUNCTURE: CPT

## 2019-11-06 PROCEDURE — 99215 PR OFFICE/OUTPT VISIT, EST, LEVL V, 40-54 MIN: ICD-10-PCS | Mod: ,,, | Performed by: INTERNAL MEDICINE

## 2019-11-06 PROCEDURE — 99215 OFFICE O/P EST HI 40 MIN: CPT | Mod: ,,, | Performed by: INTERNAL MEDICINE

## 2019-11-06 PROCEDURE — 80048 BASIC METABOLIC PNL TOTAL CA: CPT

## 2019-11-06 PROCEDURE — 25000003 PHARM REV CODE 250: Performed by: INTERNAL MEDICINE

## 2019-11-06 RX ORDER — HYDROCHLOROTHIAZIDE 12.5 MG/1
12.5 TABLET ORAL EVERY OTHER DAY
Status: DISCONTINUED | OUTPATIENT
Start: 2019-11-06 | End: 2019-11-08 | Stop reason: HOSPADM

## 2019-11-06 RX ORDER — POTASSIUM CITRATE 10 MEQ/1
10 TABLET, EXTENDED RELEASE ORAL DAILY
Status: DISCONTINUED | OUTPATIENT
Start: 2019-11-06 | End: 2019-11-08 | Stop reason: HOSPADM

## 2019-11-06 RX ORDER — POTASSIUM CITRATE 5 MEQ/1
5 TABLET, EXTENDED RELEASE ORAL 2 TIMES DAILY WITH MEALS
Status: DISCONTINUED | OUTPATIENT
Start: 2019-11-06 | End: 2019-11-06

## 2019-11-06 RX ADMIN — TAMSULOSIN HYDROCHLORIDE 0.4 MG: 0.4 CAPSULE ORAL at 08:11

## 2019-11-06 RX ADMIN — ACETAMINOPHEN 1000 MG: 500 TABLET, FILM COATED ORAL at 10:11

## 2019-11-06 RX ADMIN — ACETAMINOPHEN 1000 MG: 500 TABLET, FILM COATED ORAL at 05:11

## 2019-11-06 RX ADMIN — ACETAMINOPHEN 1000 MG: 500 TABLET, FILM COATED ORAL at 11:11

## 2019-11-06 RX ADMIN — ACETAMINOPHEN 1000 MG: 500 TABLET, FILM COATED ORAL at 04:11

## 2019-11-06 RX ADMIN — HYDROCHLOROTHIAZIDE 12.5 MG: 12.5 TABLET ORAL at 10:11

## 2019-11-06 RX ADMIN — SODIUM CHLORIDE: 0.9 INJECTION, SOLUTION INTRAVENOUS at 03:11

## 2019-11-06 RX ADMIN — SODIUM CHLORIDE: 0.9 INJECTION, SOLUTION INTRAVENOUS at 06:11

## 2019-11-06 RX ADMIN — POTASSIUM CITRATE 10 MEQ: 10 TABLET ORAL at 02:11

## 2019-11-06 NOTE — NURSING
Patient states the pain is no longer helped with tylenol alone, requested Norco.  Secure chat sent to Alyson Gonzalez with patient's request.  Awaiting response

## 2019-11-06 NOTE — SUBJECTIVE & OBJECTIVE
Interval History: No acute events overnight.  Resting comfortably in bed in NAD.  Patient reports improvement in right side flank pain and reports good UOP.  Still has not passed calculi.  She is drinking fluids appropriately and IVFs continue.  Repeat Cr today improved to 2.0. Case d/w Dr. Yin.  Will repeat KUB today to re-evaluate right sided calculus and increase IVFs to 150cc/hour.  Per nephrology recs, will also start on low dose HCTZ qod and K citrate BID.  Plan to continue IVFs overnight and re-assess Cr in the AM.        Review of Systems   Constitutional: Negative for chills, diaphoresis, fatigue and fever.   HENT: Negative for facial swelling, hearing loss, mouth sores, sneezing, sore throat, tinnitus and trouble swallowing.    Eyes: Negative for photophobia, pain, discharge, redness and visual disturbance.   Respiratory: Negative for apnea, cough, choking, chest tightness, shortness of breath, wheezing and stridor.    Cardiovascular: Negative for chest pain, palpitations and leg swelling.   Gastrointestinal: Negative for abdominal distention, abdominal pain, anal bleeding, blood in stool, constipation, diarrhea, nausea, rectal pain and vomiting.   Endocrine: Negative for cold intolerance, heat intolerance, polydipsia, polyphagia and polyuria.   Genitourinary: Positive for flank pain. Negative for difficulty urinating, dysuria, frequency, hematuria, pelvic pain, urgency, vaginal bleeding, vaginal discharge and vaginal pain.        C/o mild right sided flank pain that is improving   Musculoskeletal: Negative for arthralgias, back pain, gait problem, myalgias and neck stiffness.   Skin: Negative for pallor, rash and wound.   Allergic/Immunologic: Negative for food allergies.   Neurological: Negative for dizziness, tremors, seizures, syncope, speech difficulty, weakness and headaches.   Hematological: Negative for adenopathy. Does not bruise/bleed easily.   Psychiatric/Behavioral: Negative for behavioral  problems and confusion. The patient is not nervous/anxious.    All other systems reviewed and are negative.    Objective:     Vital Signs (Most Recent):  Temp: 98.5 °F (36.9 °C) (11/06/19 0717)  Pulse: 75 (11/06/19 0717)  Resp: 18 (11/06/19 0717)  BP: 118/61 (11/06/19 0717)  SpO2: 98 % (11/06/19 0717) Vital Signs (24h Range):  Temp:  [98.2 °F (36.8 °C)-99.1 °F (37.3 °C)] 98.5 °F (36.9 °C)  Pulse:  [] 75  Resp:  [18-20] 18  SpO2:  [97 %-100 %] 98 %  BP: (102-149)/(57-91) 118/61     Weight: 105.2 kg (231 lb 14.8 oz)  Body mass index is 38.59 kg/m².    Intake/Output Summary (Last 24 hours) at 11/6/2019 0909  Last data filed at 11/6/2019 0800  Gross per 24 hour   Intake 2889.58 ml   Output 600 ml   Net 2289.58 ml      Physical Exam   Constitutional: She is oriented to person, place, and time. She appears well-developed and well-nourished.   HENT:   Head: Normocephalic and atraumatic.   Nose: Nose normal.   Mouth/Throat: Oropharynx is clear and moist.   Eyes: Pupils are equal, round, and reactive to light. Conjunctivae are normal. No scleral icterus.   Neck: Normal range of motion. Neck supple.   Cardiovascular: Normal rate, regular rhythm and normal heart sounds. Exam reveals no gallop and no friction rub.   No murmur heard.  Pulmonary/Chest: Effort normal and breath sounds normal.   Abdominal: Soft. Bowel sounds are normal.   Musculoskeletal: Normal range of motion. She exhibits no edema or tenderness.   Neurological: She is alert and oriented to person, place, and time.   Skin: Skin is warm and dry.   Psychiatric: She has a normal mood and affect. Her behavior is normal.   Vitals reviewed.      Significant Labs:   BMP:   Recent Labs   Lab 11/06/19  0729   GLU 83      K 3.9      CO2 17*   BUN 21*   CREATININE 2.0*   CALCIUM 8.2*     CBC:   Recent Labs   Lab 11/05/19  0958   WBC 12.79*   HGB 12.3   HCT 37.5        Urine Culture: No results for input(s): LABURIN in the last 48 hours.  Urine  Studies:   Recent Labs   Lab 11/05/19  1422   COLORU Yellow   APPEARANCEUA Clear   PHUR 6.0   SPECGRAV 1.010   PROTEINUA Negative   GLUCUA Negative   KETONESU 1+*   BILIRUBINUA Negative   OCCULTUA 3+*   NITRITE Negative   UROBILINOGEN Negative   LEUKOCYTESUR 1+*   RBCUA 8*   WBCUA 6*   BACTERIA Rare   SQUAMEPITHEL 5       Significant Imaging: I have reviewed all pertinent imaging results/findings within the past 24 hours.

## 2019-11-06 NOTE — PLAN OF CARE
11/06/19 1007   Discharge Assessment   Assessment Type Discharge Planning Assessment   Confirmed/corrected address and phone number on facesheet? Yes   Assessment information obtained from? Patient   Prior to hospitilization cognitive status: Alert/Oriented   Prior to hospitalization functional status: Independent   Current cognitive status: Alert/Oriented   Current Functional Status: Independent   Lives With facility resident   Able to Return to Prior Arrangements yes   Is patient able to care for self after discharge? Yes   Who are your caregiver(s) and their phone number(s)? Angeline Burleson (mother) [phone]   Patient's perception of discharge disposition group home   Readmission Within the Last 30 Days no previous admission in last 30 days   Patient currently being followed by outpatient case management? No   Patient currently receives any other outside agency services? Yes   Name and contact number of agency or person providing outside services [facility] (substance use recovery)   Is it the patient/care giver preference to resume care with the current outside agency? Yes   Equipment Currently Used at Home none   Do you have any problems affording any of your prescribed medications? No   Is the patient taking medications as prescribed? yes   Does the patient have transportation home? Yes   Transportation Anticipated family or friend will provide   Does the patient receive services at the [facility]? No   Discharge Plan A Group Home   DME Needed Upon Discharge  none   Patient/Family in Agreement with Plan yes     Met with pt at bedside for DC assessment. Pt is a resident of a sober living house connected with [facility]. Pt is independent with ADLs and does not use any DME. Pt is complaining of pain as she rejected opioid pain medication due to being in recovery. Pt is unlikely to have any CM needs. SWer provided a transitional care folder, information on advanced directives, information on pharmacy  bedside delivery, and discharge planning begins on admission with contact information for any needs/questions. Pt opted for bedside medication delivery. Pt typically uses [facility] on [address]. Information below.    [address], [place] [address]  [phone]  Hunter Mays LMSW 11/6/2019 10:28 AM

## 2019-11-06 NOTE — ASSESSMENT & PLAN NOTE
- WBC 12.79 in ED.   At [facility] on 11/3 WBC = 12.8  - U/A indicates RBC and + 1 leukocytes  - Given empiric Rocephin   - UC pending  - BC show NGTD  - Will hold IV ABX until results of urine culture  - Daily CBC

## 2019-11-06 NOTE — ASSESSMENT & PLAN NOTE
- Placed in OBS  - Nephrology following; JAZMINE is secondary to acute analgesic nephropathy due to Toradol and NSAIDs  - Continue IV fluids  - Avoid nephrotoxic meds  - Strict I and O  - Repeat chemistries in AM

## 2019-11-06 NOTE — ASSESSMENT & PLAN NOTE
- Per Urology no intervention needed at this time  - Continue Flomax  - Continue IV fluids and encouraged oral intake  - Strain urine to monitor for stone  - Repeat KUB pending  - Non NSAID analgesia  - Started on HCTZ qod and K citrate BID  - Will need to Follow up with Dr. Lam at [facility] upon DC

## 2019-11-06 NOTE — PROGRESS NOTES
[facility] Hospital Medicine  Progress Note    Patient Name: Neha Vázquez  MRN: [id number]  Patient Class: OP- Observation   Admission Date: 11/5/2019  Length of Stay: 0 days  Attending Physician: Segun Brewer MD  Primary Care Provider: Provider Not[employer]        Subjective:     Principal Problem:JAZMINE (acute kidney injury)        HPI:  Neha Vázquez is a 23 y.o. female patient with a PMHx of Nephrolithiasis, HCV and IV drug abuse (she is in sober living house) who presents to the Emergency Department for evaluation of R sided flank pain which onset gradually several days ago with associated nausea.  She was seen at [facility] 2 nights ago for same symptoms and dx with kidney stone.  While at [facility] ED she received IV Toradol and a prescription for Toradol. She presents due to continued pain. She denies fever, chills, vomiting, SOB, hematuria and other symptoms. Vital signs on arrival: normal. Labs find a mild leukocytosis, CO 2 = 20, creatinine 2.4 (11/3 - 1.01) with GFR 28. Renal stone study was significant for mild right hydroureteronephrosis secondary to a 5 x 5 mm right distal ureteral calculus.  Right renal edema and perinephric inflammatory stranding.  Interval development of diffuse left renal cortical scarring.  No evidence of left hydronephrosis.    Overview/Hospital Course:  On 11/5 patient was placed in Observation secondary to JAZMINE thought to be r/t NSAID and Toradol use.  Initial Cr elevated to 2.4 (1.01 on 11/3 at [facility]).  Renal CT showed 5 x 5 mm right distal ureteral calculus resulting in mild right hydronephrosis.  Per d/w Urology, no intervention required at this time and patient was started on Flomax.  Patient was also started on IVFs and Nephrology consulted to assist with care.      As of 11/6 patient reports improvement in right side flank pain and reports good UOP.  Still has not passed calculi.  She is drinking fluids appropriately and IVFs continue.  Repeat  Cr today improved to 2.0. Case d/w Dr. Yin.  Will repeat KUB today to re-evaluate right sided calculus and increase IVFs to 150cc/hour.  Per nephrology recs, will also start on low dose HCTZ qod and K citrate BID.  Plan to continue IVFs overnight and re-assess Cr in the AM.        Interval History: No acute events overnight.  Resting comfortably in bed in NAD.  Patient reports improvement in right side flank pain and reports good UOP.  Still has not passed calculi.  She is drinking fluids appropriately and IVFs continue.  Repeat Cr today improved to 2.0. Case d/w Dr. Yin.  Will repeat KUB today to re-evaluate right sided calculus and increase IVFs to 150cc/hour.  Per nephrology recs, will also start on low dose HCTZ qod and K citrate BID.  Plan to continue IVFs overnight and re-assess Cr in the AM.        Review of Systems   Constitutional: Negative for chills, diaphoresis, fatigue and fever.   HENT: Negative for facial swelling, hearing loss, mouth sores, sneezing, sore throat, tinnitus and trouble swallowing.    Eyes: Negative for photophobia, pain, discharge, redness and visual disturbance.   Respiratory: Negative for apnea, cough, choking, chest tightness, shortness of breath, wheezing and stridor.    Cardiovascular: Negative for chest pain, palpitations and leg swelling.   Gastrointestinal: Negative for abdominal distention, abdominal pain, anal bleeding, blood in stool, constipation, diarrhea, nausea, rectal pain and vomiting.   Endocrine: Negative for cold intolerance, heat intolerance, polydipsia, polyphagia and polyuria.   Genitourinary: Positive for flank pain. Negative for difficulty urinating, dysuria, frequency, hematuria, pelvic pain, urgency, vaginal bleeding, vaginal discharge and vaginal pain.        C/o mild right sided flank pain that is improving   Musculoskeletal: Negative for arthralgias, back pain, gait problem, myalgias and neck stiffness.   Skin: Negative for pallor, rash and wound.    Allergic/Immunologic: Negative for food allergies.   Neurological: Negative for dizziness, tremors, seizures, syncope, speech difficulty, weakness and headaches.   Hematological: Negative for adenopathy. Does not bruise/bleed easily.   Psychiatric/Behavioral: Negative for behavioral problems and confusion. The patient is not nervous/anxious.    All other systems reviewed and are negative.    Objective:     Vital Signs (Most Recent):  Temp: 98.5 °F (36.9 °C) (11/06/19 0717)  Pulse: 75 (11/06/19 0717)  Resp: 18 (11/06/19 0717)  BP: 118/61 (11/06/19 0717)  SpO2: 98 % (11/06/19 0717) Vital Signs (24h Range):  Temp:  [98.2 °F (36.8 °C)-99.1 °F (37.3 °C)] 98.5 °F (36.9 °C)  Pulse:  [] 75  Resp:  [18-20] 18  SpO2:  [97 %-100 %] 98 %  BP: (102-149)/(57-91) 118/61     Weight: 105.2 kg (231 lb 14.8 oz)  Body mass index is 38.59 kg/m².    Intake/Output Summary (Last 24 hours) at 11/6/2019 0909  Last data filed at 11/6/2019 0800  Gross per 24 hour   Intake 2889.58 ml   Output 600 ml   Net 2289.58 ml      Physical Exam   Constitutional: She is oriented to person, place, and time. She appears well-developed and well-nourished.   HENT:   Head: Normocephalic and atraumatic.   Nose: Nose normal.   Mouth/Throat: Oropharynx is clear and moist.   Eyes: Pupils are equal, round, and reactive to light. Conjunctivae are normal. No scleral icterus.   Neck: Normal range of motion. Neck supple.   Cardiovascular: Normal rate, regular rhythm and normal heart sounds. Exam reveals no gallop and no friction rub.   No murmur heard.  Pulmonary/Chest: Effort normal and breath sounds normal.   Abdominal: Soft. Bowel sounds are normal.   Musculoskeletal: Normal range of motion. She exhibits no edema or tenderness.   Neurological: She is alert and oriented to person, place, and time.   Skin: Skin is warm and dry.   Psychiatric: She has a normal mood and affect. Her behavior is normal.   Vitals reviewed.      Significant Labs:   BMP:   Recent  Labs   Lab 11/06/19  0729   GLU 83      K 3.9      CO2 17*   BUN 21*   CREATININE 2.0*   CALCIUM 8.2*     CBC:   Recent Labs   Lab 11/05/19  0958   WBC 12.79*   HGB 12.3   HCT 37.5        Urine Culture: No results for input(s): LABURIN in the last 48 hours.  Urine Studies:   Recent Labs   Lab 11/05/19  1422   COLORU Yellow   APPEARANCEUA Clear   PHUR 6.0   SPECGRAV 1.010   PROTEINUA Negative   GLUCUA Negative   KETONESU 1+*   BILIRUBINUA Negative   OCCULTUA 3+*   NITRITE Negative   UROBILINOGEN Negative   LEUKOCYTESUR 1+*   RBCUA 8*   WBCUA 6*   BACTERIA Rare   SQUAMEPITHEL 5       Significant Imaging: I have reviewed all pertinent imaging results/findings within the past 24 hours.      Assessment/Plan:      * JAZMINE (acute kidney injury)  - Placed in OBS  - Nephrology following; JAZMINE is secondary to acute analgesic nephropathy due to Toradol and NSAIDs  - Continue IV fluids  - Avoid nephrotoxic meds  - Strict I and O  - Repeat chemistries in AM      Ureterolithiasis  - Per Urology no intervention needed at this time  - Continue Flomax  - Continue IV fluids and encouraged oral intake  - Strain urine to monitor for stone  - Repeat KUB pending  - Non NSAID analgesia  - Started on HCTZ qod and K citrate BID  - Will need to Follow up with Dr. Lam at [facility] upon [facility]    Leukocytosis  - WBC 12.79 in ED.   At [facility] on 11/3 WBC = 12.8  - U/A indicates RBC and + 1 leukocytes  - Given empiric Rocephin   - UC pending  - BC show NGTD  - Will hold IV ABX until results of urine culture  - Daily CBC      VTE Risk Mitigation (From admission, onward)         Ordered     Place sequential compression device  Until discontinued      11/06/19 0924                      Alyson Gonzalez, PETER, ACNP-BC  Department of Hospital Medicine   [facility]

## 2019-11-06 NOTE — HOSPITAL COURSE
On 11/5 patient was placed in Observation secondary to JAZMINE thought to be r/t NSAID and Toradol use.  Initial Cr elevated to 2.4 (1.01 on 11/3 at [facility]).  Renal CT showed 5 x 5 mm right distal ureteral calculus resulting in mild right hydronephrosis.  Per d/w Urology, no intervention required at this time and patient was started on Flomax.  Patient was also started on IVFs and Nephrology consulted to assist with care.      As of 11/6 patient reports improvement in right side flank pain and reports good UOP.  Still has not passed calculi.  She is drinking fluids appropriately and IVFs continue.  Repeat Cr today improved to 2.0. Case d/w Dr. Yin.  Will repeat KUB today to re-evaluate right sided calculus and increase IVFs to 150cc/hour.  Per nephrology recs, will also start on low dose HCTZ qod and K citrate BID.  Plan to continue IVFs overnight and re-assess Cr in the AM.      11/7 - Pt spiked a temp of 101.4 this AM. IV Rocephin started. Urine culture was negative. Pt to go to OR for cysto and right ureteral stent by Urology.Pt states she is still having right sided flank pain. Denies any difficulty urinating. Denies nausea/vomiting.   11/8 S/P cysto with right ureteral stent. Repeat urine culture pending. Serum creatinine has normalized and pt afebrile for last 24 hours. The right sided flank pain has resolved. She verbalized follow up with Urology in 2 weeks. She was seen and examined and determined to be safe and stable for discharge. She was prescribed Cipro bid x 7 days.

## 2019-11-06 NOTE — H&P
"[facility] Hospital Medicine  History & Physical    Patient Name: Neha Vázquez  MRN: [id number]  Admission Date: 11/5/2019  Attending Physician: Segun Brewer MD   Primary Care Provider: Provider [employer]         Patient information was obtained from patient, past medical records and ER records.     Subjective:     Principal Problem:JAZMINE (acute kidney injury)    Chief Complaint:   Chief Complaint   Patient presents with    Flank Pain     R flank pain for several days. Seen at [facility] 2 nights ago. Dx with kidney stone. Taking rx with no relief. "it's making my sciatic nerve mess up."         HPI: Neha Vázquez is a 23 y.o. female patient with a PMHx of Nephrolithiasis, HCV and IV drug abuse (she is in sober living house) who presents to the Emergency Department for evaluation of R sided flank pain which onset gradually several days ago with associated nausea.  She was seen at [facility] 2 nights ago for same symptoms and dx with kidney stone.  While at [facility] ED she received IV Toradol and a prescription for Toradol. She presents due to continued pain. She denies fever, chills, vomiting, SOB, hematuria and other symptoms. Vital signs on arrival: normal. Labs find a mild leukocytosis, CO 2 = 20, creatinine 2.4 (11/3 - 1.01) with GFR 28. Renal stone study was significant for mild right hydroureteronephrosis secondary to a 5 x 5 mm right distal ureteral calculus.  Right renal edema and perinephric inflammatory stranding.  Interval development of diffuse left renal cortical scarring.  No evidence of left hydronephrosis.    Past Medical History:   Diagnosis Date    HCV (hepatitis C virus)     IV drug abuse        History reviewed. No pertinent surgical history.    Review of patient's allergies indicates:  No Known Allergies    No current facility-administered medications on file prior to encounter.      Current Outpatient Medications on File Prior to Encounter   Medication Sig    " ketorolac (TORADOL) 10 mg tablet Take 10 mg by mouth every 6 (six) hours.    naltrexone (DEPADE) 50 mg tablet Take 50 mg by mouth once daily.    [DISCONTINUED] ketorolac (TORADOL) 10 mg tablet Take 1 tablet (10 mg total) by mouth every 6 (six) hours.    [DISCONTINUED] PRENATAL NO.25/IRON/FA #6/DHA (PRENA1 ORAL) Take by mouth.     Family History     Problem Relation (Age of Onset)    Heart disease Maternal Grandfather        Tobacco Use    Smoking status: Never Smoker    Smokeless tobacco: Never Used   Substance and Sexual Activity    Alcohol use: No    Drug use: Yes    Sexual activity: Yes     Partners: Male     Review of Systems   Constitutional: Positive for chills.   HENT: Negative.    Respiratory: Negative.    Cardiovascular: Negative.    Gastrointestinal: Positive for nausea. Negative for abdominal pain, constipation, diarrhea and vomiting.   Genitourinary: Positive for flank pain. Negative for decreased urine volume, difficulty urinating and frequency.   Neurological: Negative.    Hematological: Negative.    Psychiatric/Behavioral: Negative.      Objective:     Vital Signs (Most Recent):  Temp: 98.4 °F (36.9 °C) (11/05/19 1555)  Pulse: 86 (11/05/19 1555)  Resp: 18 (11/05/19 1555)  BP: 122/70 (11/05/19 1555)  SpO2: 99 % (11/05/19 1555) Vital Signs (24h Range):  Temp:  [98.2 °F (36.8 °C)-99.1 °F (37.3 °C)] 98.4 °F (36.9 °C)  Pulse:  [86-98] 86  Resp:  [18-20] 18  SpO2:  [97 %-100 %] 99 %  BP: (102-149)/(58-91) 122/70     Weight: 105.2 kg (231 lb 14.8 oz)  Body mass index is 38.59 kg/m².    Physical Exam   Constitutional: She is oriented to person, place, and time. She appears well-developed and well-nourished.   HENT:   Head: Normocephalic and atraumatic.   Nose: Nose normal.   Mouth/Throat: Oropharynx is clear and moist.   Eyes: Pupils are equal, round, and reactive to light. Conjunctivae are normal. No scleral icterus.   Neck: Normal range of motion. Neck supple.   Cardiovascular: Normal rate, regular  rhythm and normal heart sounds. Exam reveals no gallop and no friction rub.   No murmur heard.  Pulmonary/Chest: Effort normal and breath sounds normal.   Abdominal: Soft. Bowel sounds are normal.   Musculoskeletal: Normal range of motion. She exhibits no edema or tenderness.   Neurological: She is alert and oriented to person, place, and time.   Skin: Skin is warm and dry.   Psychiatric: She has a normal mood and affect. Her behavior is normal.   Vitals reviewed.        CRANIAL NERVES     CN III, IV, VI   Pupils are equal, round, and reactive to light.       Significant Labs:   CBC:   Recent Labs   Lab 11/05/19  0958   WBC 12.79*   HGB 12.3   HCT 37.5        CMP:   Recent Labs   Lab 11/05/19  0958      K 4.2      CO2 20*      BUN 31*   CREATININE 2.4*   CALCIUM 8.9   PROT 7.4   ALBUMIN 3.2*   BILITOT 0.4   ALKPHOS 66   AST 19   ALT 15   ANIONGAP 12   EGFRNONAA 28*     All pertinent labs within the past 24 hours have been reviewed.    Significant Imaging: I have reviewed all pertinent imaging results/findings within the past 24 hours.    Assessment/Plan:     * JAZMINE (acute kidney injury)  Per Nephrology - acute analgesic nephropathy due to Toradol and NSAIDs  IV fluids  Avoid nephrotoxic meds  Repeat chemistries in AM      Leukocytosis  WBC 12.79 today in our ED.   At [facility] on 11/3 WBC = 12.8  U/A indicates RBC and + 1 leukocytes  Empiric Rocephin given and urine culture pending  Will hold IV ABX until results of urine culture      Ureterolithiasis  Per Urology - no intervention needed at this time  Continue Flomax  IV fluids and force fluids when at home  Strain urine to monitor for stone  Follow up with Dr. Lam at [facility]        VTE Risk Mitigation (From admission, onward)    None             Stephanie Howard, NP  Department of Hospital Medicine   [facility]

## 2019-11-06 NOTE — ASSESSMENT & PLAN NOTE
24 y/o female with JAZMINE and a kidney stone:     JAZMINE (acute kidney injury)  JAZMINE due to toradol use  Acute analgesic nephropathy  The kidney stone is unilateral and has no relation to JAZMINE  K normal  Metabolic acidosis, mild  JAZMINE should resolve with IVF's and after stopping toradol and with avoiding other NSAIDs.  Pt was advised about risk of kidney damage with NSAIds     Ureterolithiasis  5 year h/o of kidney stones  No prior w/u  Has dietary risk factors for both Ca oxalate and uric acid stones  No infections suspected, struvite stones not suspected  Has a 5 mm stone in the distal right ureter with mild hydronephrosis  Will order a KUB to differentiate between uric acid and Ca oxalate stones  Continue NS IVF's, received boluses in ER  Start flomax 0.4 mg po qd to dilate the ureter  Non-NSAIDs based pain relief (however, note that pt has a h/o of narcotic abuse and was in drug rehab)  Pt later can do a 24 hour urine collection for stone risk stratification.  Dietary risk factors for nephrolithiasis discussed with pt        Plans and recommendations:  As discussed above  Discussed with pt, opportunity for questions provided

## 2019-11-06 NOTE — SUBJECTIVE & OBJECTIVE
Interval History: Pt was seen and examined. No new events, stable last pm. Pt still having pain in right flank. No abd pain, no fever.    Review of patient's allergies indicates:  No Known Allergies  Current Facility-Administered Medications   Medication Frequency    0.9%  NaCl infusion Continuous    acetaminophen tablet 1,000 mg Q6H PRN    hydroCHLOROthiazide tablet 12.5 mg Every other day    influenza (QUADRIVALENT PF) vaccine 0.5 mL vaccine x 1 dose    ondansetron injection 4 mg Q8H PRN    potassium citrate SR tablet 5 mEq BID WM    promethazine (PHENERGAN) 12.5 mg in dextrose 5 % 50 mL IVPB Q6H PRN    tamsulosin 24 hr capsule 0.4 mg Daily       Objective:     Vital Signs (Most Recent):  Temp: 98.5 °F (36.9 °C) (11/06/19 0717)  Pulse: 75 (11/06/19 0717)  Resp: 18 (11/06/19 0717)  BP: 118/61 (11/06/19 0717)  SpO2: 98 % (11/06/19 0717)  O2 Device (Oxygen Therapy): room air (11/05/19 2005) Vital Signs (24h Range):  Temp:  [98.2 °F (36.8 °C)-99 °F (37.2 °C)] 98.5 °F (36.9 °C)  Pulse:  [] 75  Resp:  [18-20] 18  SpO2:  [97 %-100 %] 98 %  BP: (102-122)/(57-91) 118/61     Weight: 105.2 kg (231 lb 14.8 oz) (11/05/19 0926)  Body mass index is 38.59 kg/m².  Body surface area is 2.2 meters squared.    I/O last 3 completed shifts:  In: 2689.6 [I.V.:1639.6; IV Piggyback:1050]  Out: 600 [Urine:600]    Physical Exam   Constitutional: She is oriented to person, place, and time. She appears well-developed and well-nourished. No distress.   HENT:   Head: Normocephalic and atraumatic.   Neck: No JVD present.   Cardiovascular: Normal rate, regular rhythm and normal heart sounds. Exam reveals no friction rub.   Pulmonary/Chest: Effort normal and breath sounds normal. No stridor. No respiratory distress. She has no wheezes. She has no rales.   Abdominal: Soft. Bowel sounds are normal. She exhibits no distension. There is no tenderness. There is no guarding.   Musculoskeletal: She exhibits no edema.   Neurological: She is  alert and oriented to person, place, and time.   Skin: Skin is warm and dry.   Psychiatric: She has a normal mood and affect. Her behavior is normal. Thought content normal.   Nursing note and vitals reviewed.      Significant Labs: reviewed  BMP  Lab Results   Component Value Date     11/06/2019    K 3.9 11/06/2019     11/06/2019    CO2 17 (L) 11/06/2019    BUN 21 (H) 11/06/2019    CREATININE 2.0 (H) 11/06/2019    CALCIUM 8.2 (L) 11/06/2019    ANIONGAP 10 11/06/2019    ESTGFRAFRICA 40 (A) 11/06/2019    EGFRNONAA 34 (A) 11/06/2019     Lab Results   Component Value Date    WBC 12.79 (H) 11/05/2019    HGB 12.3 11/05/2019    HCT 37.5 11/05/2019    MCV 85 11/05/2019     11/05/2019       Lab Results   Component Value Date    PTH 95.2 (H) 11/05/2019    CALCIUM 8.2 (L) 11/06/2019         Significant Imaging: reviewed

## 2019-11-06 NOTE — PROGRESS NOTES
[facility] -   Nephrology  Progress Note    Patient Name: Neha Vázquez  MRN: [id number]  Admission Date: 11/5/2019  Hospital Length of Stay: 0 days  Attending Provider: Segun Brewer MD   Primary Care Physician: Provider Notinsystem  Principal Problem:JAZMINE (acute kidney injury)    Subjective:     HPI: Pt was seen and examined. Chart and h/o reviewed. Pt is a 22 y/o female who presented to ER for right sided flank pain. Pt has a h/o of nephrolithiasis x 5 years. Noted that she has a s Cr is 2.4, was normal 2 months ago. The stones have occurred in both left and right side. She previously saw a nephrologist and neglected to complete the w/u. She has never the stones, the stones have never been analyzed. Pt presented to [facility] ER 2 days ago for right sided pain, a stone was identified, per pt, in the right side. Pt was give IVF's, toradol injection, and was discharged home with a Rx for toradol tablets 10 mg bid, which she has taken in the past 2 days. The pain is in the right side, extending to the groin. Pt denies fever, no cloudy urine. No pain in left side. Pt has significant dietary risk factors for stone formation, including very salty diet, excessive meat intake, and not drinking adequate water. Discussed with urology.     Interval History: Pt was seen and examined. No new events, stable last pm. Pt still having pain in right flank. No abd pain, no fever.    Review of patient's allergies indicates:  No Known Allergies  Current Facility-Administered Medications   Medication Frequency    0.9%  NaCl infusion Continuous    acetaminophen tablet 1,000 mg Q6H PRN    hydroCHLOROthiazide tablet 12.5 mg Every other day    influenza (QUADRIVALENT PF) vaccine 0.5 mL vaccine x 1 dose    ondansetron injection 4 mg Q8H PRN    potassium citrate SR tablet 5 mEq BID WM    promethazine (PHENERGAN) 12.5 mg in dextrose 5 % 50 mL IVPB Q6H PRN    tamsulosin 24 hr capsule 0.4 mg Daily      sodium  chloride 0.9% 150 mL/hr at 11/06/19 1027         Objective:     Vital Signs (Most Recent):  Temp: 98.5 °F (36.9 °C) (11/06/19 0717)  Pulse: 75 (11/06/19 0717)  Resp: 18 (11/06/19 0717)  BP: 118/61 (11/06/19 0717)  SpO2: 98 % (11/06/19 0717)  O2 Device (Oxygen Therapy): room air (11/05/19 2005) Vital Signs (24h Range):  Temp:  [98.2 °F (36.8 °C)-99 °F (37.2 °C)] 98.5 °F (36.9 °C)  Pulse:  [] 75  Resp:  [18-20] 18  SpO2:  [97 %-100 %] 98 %  BP: (102-122)/(57-91) 118/61     Weight: 105.2 kg (231 lb 14.8 oz) (11/05/19 0926)  Body mass index is 38.59 kg/m².  Body surface area is 2.2 meters squared.    I/O last 3 completed shifts:  In: 2689.6 [I.V.:1639.6; IV Piggyback:1050]  Out: 600 [Urine:600]    Physical Exam   Constitutional: She is oriented to person, place, and time. She appears well-developed and well-nourished. No distress.   HENT:   Head: Normocephalic and atraumatic.   Neck: No JVD present.   Cardiovascular: Normal rate, regular rhythm and normal heart sounds. Exam reveals no friction rub.   Pulmonary/Chest: Effort normal and breath sounds normal. No stridor. No respiratory distress. She has no wheezes. She has no rales.   Abdominal: Soft. Bowel sounds are normal. She exhibits no distension. There is no tenderness. There is no guarding.   Musculoskeletal: She exhibits no edema.   Neurological: She is alert and oriented to person, place, and time.   Skin: Skin is warm and dry.   Psychiatric: She has a normal mood and affect. Her behavior is normal. Thought content normal.   Nursing note and vitals reviewed.      Significant Labs: reviewed  BMP  Lab Results   Component Value Date     11/06/2019    K 3.9 11/06/2019     11/06/2019    CO2 17 (L) 11/06/2019    BUN 21 (H) 11/06/2019    CREATININE 2.0 (H) 11/06/2019    CALCIUM 8.2 (L) 11/06/2019    ANIONGAP 10 11/06/2019    ESTGFRAFRICA 40 (A) 11/06/2019    EGFRNONAA 34 (A) 11/06/2019     Lab Results   Component Value Date    WBC 12.79 (H) 11/05/2019     HGB 12.3 11/05/2019    HCT 37.5 11/05/2019    MCV 85 11/05/2019     11/05/2019       Lab Results   Component Value Date    PTH 95.2 (H) 11/05/2019    CALCIUM 8.2 (L) 11/06/2019         Significant Imaging: reviewed    Assessment/Plan:     22 y/o female with JAZMINE and a kidney stone:     JAZMINE (acute kidney injury)  s Cr improved, lower  JAZMINE resolving. JAZMINE due to toradol use  Acute analgesic nephropathy  No relation to the unilateral kidney stone   K normal  Metabolic acidosis, mild, stable  Pt has been advised about risk of kidney damage with NSAIds     Ureterolithiasis  Stone is visible both on KUB and CT scan  Likely a calcium based or combination stone   Has dietary risk factors for both Ca oxalate and uric acid stones  U/a unremarkable  No infections suspected, struvite stones not suspected  Mild hydronephrosis  Noted mild increase in iPTH, primary hyperparathyroidism may be present, though ca not high (would be a risk factor for nephrolithiasis)    Continue NS IVF's, received boluses in ER  Continue flomax 0.4 mg po qd to dilate the ureter  Non-NSAIDs based pain relief (however, note that pt has a h/o of narcotic abuse and was in drug rehab)  Advised pt again of dietary risk factors for kidney stones  K citrate 5 mEq po bid to tid or freshly squeezed lemon juice daily  HCTZ 12.5 mg po qd to reduce hypercalciuria  Pt later can do a 24 hour urine collection for stone risk stratification.        Plans and recommendations:  As discussed above  Discussed with pt, opportunity for questions provided  Increase NS IVF rate to 200 ml/hr  Ambulate pt        Lizet Yin MD  Nephrology  [facility]

## 2019-11-06 NOTE — PLAN OF CARE
No adverse events this shift, free from falls, safety measures maintained, IV fluids continue, pain partially controlled with tylenol, patient refused use of narcotic medication, heat pads applied prn, POC reviewed, call bell in reach, chart check complete

## 2019-11-06 NOTE — SUBJECTIVE & OBJECTIVE
Past Medical History:   Diagnosis Date    HCV (hepatitis C virus)     IV drug abuse        History reviewed. No pertinent surgical history.    Review of patient's allergies indicates:  No Known Allergies    No current facility-administered medications on file prior to encounter.      Current Outpatient Medications on File Prior to Encounter   Medication Sig    ketorolac (TORADOL) 10 mg tablet Take 10 mg by mouth every 6 (six) hours.    naltrexone (DEPADE) 50 mg tablet Take 50 mg by mouth once daily.    [DISCONTINUED] ketorolac (TORADOL) 10 mg tablet Take 1 tablet (10 mg total) by mouth every 6 (six) hours.    [DISCONTINUED] PRENATAL NO.25/IRON/FA #6/DHA (PRENA1 ORAL) Take by mouth.     Family History     Problem Relation (Age of Onset)    Heart disease Maternal Grandfather        Tobacco Use    Smoking status: Never Smoker    Smokeless tobacco: Never Used   Substance and Sexual Activity    Alcohol use: No    Drug use: Yes    Sexual activity: Yes     Partners: Male     Review of Systems   Constitutional: Positive for chills.   HENT: Negative.    Respiratory: Negative.    Cardiovascular: Negative.    Gastrointestinal: Positive for nausea. Negative for abdominal pain, constipation, diarrhea and vomiting.   Genitourinary: Positive for flank pain. Negative for decreased urine volume, difficulty urinating and frequency.   Neurological: Negative.    Hematological: Negative.    Psychiatric/Behavioral: Negative.      Objective:     Vital Signs (Most Recent):  Temp: 98.4 °F (36.9 °C) (11/05/19 1555)  Pulse: 86 (11/05/19 1555)  Resp: 18 (11/05/19 1555)  BP: 122/70 (11/05/19 1555)  SpO2: 99 % (11/05/19 1555) Vital Signs (24h Range):  Temp:  [98.2 °F (36.8 °C)-99.1 °F (37.3 °C)] 98.4 °F (36.9 °C)  Pulse:  [86-98] 86  Resp:  [18-20] 18  SpO2:  [97 %-100 %] 99 %  BP: (102-149)/(58-91) 122/70     Weight: 105.2 kg (231 lb 14.8 oz)  Body mass index is 38.59 kg/m².    Physical Exam   Constitutional: She is oriented to  person, place, and time. She appears well-developed and well-nourished.   HENT:   Head: Normocephalic and atraumatic.   Nose: Nose normal.   Mouth/Throat: Oropharynx is clear and moist.   Eyes: Pupils are equal, round, and reactive to light. Conjunctivae are normal. No scleral icterus.   Neck: Normal range of motion. Neck supple.   Cardiovascular: Normal rate, regular rhythm and normal heart sounds. Exam reveals no gallop and no friction rub.   No murmur heard.  Pulmonary/Chest: Effort normal and breath sounds normal.   Abdominal: Soft. Bowel sounds are normal.   Musculoskeletal: Normal range of motion. She exhibits no edema or tenderness.   Neurological: She is alert and oriented to person, place, and time.   Skin: Skin is warm and dry.   Psychiatric: She has a normal mood and affect. Her behavior is normal.   Vitals reviewed.        CRANIAL NERVES     CN III, IV, VI   Pupils are equal, round, and reactive to light.       Significant Labs:   CBC:   Recent Labs   Lab 11/05/19  0958   WBC 12.79*   HGB 12.3   HCT 37.5        CMP:   Recent Labs   Lab 11/05/19  0958      K 4.2      CO2 20*      BUN 31*   CREATININE 2.4*   CALCIUM 8.9   PROT 7.4   ALBUMIN 3.2*   BILITOT 0.4   ALKPHOS 66   AST 19   ALT 15   ANIONGAP 12   EGFRNONAA 28*     All pertinent labs within the past 24 hours have been reviewed.    Significant Imaging: I have reviewed all pertinent imaging results/findings within the past 24 hours.

## 2019-11-07 ENCOUNTER — ANESTHESIA (OUTPATIENT)
Dept: SURGERY | Facility: HOSPITAL | Age: 24
End: 2019-11-07
Payer: MEDICAID

## 2019-11-07 ENCOUNTER — ANESTHESIA EVENT (OUTPATIENT)
Dept: SURGERY | Facility: HOSPITAL | Age: 24
End: 2019-11-07
Payer: MEDICAID

## 2019-11-07 LAB
ANION GAP SERPL CALC-SCNC: 10 MMOL/L (ref 8–16)
BACTERIA #/AREA URNS HPF: ABNORMAL /HPF
BACTERIA UR CULT: NORMAL
BASOPHILS # BLD AUTO: 0.06 K/UL (ref 0–0.2)
BASOPHILS NFR BLD: 0.5 % (ref 0–1.9)
BILIRUB UR QL STRIP: NEGATIVE
BUN SERPL-MCNC: 19 MG/DL (ref 6–20)
CALCIUM SERPL-MCNC: 9.1 MG/DL (ref 8.7–10.5)
CHLORIDE SERPL-SCNC: 108 MMOL/L (ref 95–110)
CLARITY UR: ABNORMAL
CO2 SERPL-SCNC: 21 MMOL/L (ref 23–29)
COLOR UR: YELLOW
CREAT SERPL-MCNC: 2 MG/DL (ref 0.5–1.4)
DIFFERENTIAL METHOD: ABNORMAL
EOSINOPHIL # BLD AUTO: 0.3 K/UL (ref 0–0.5)
EOSINOPHIL NFR BLD: 2.2 % (ref 0–8)
ERYTHROCYTE [DISTWIDTH] IN BLOOD BY AUTOMATED COUNT: 13.9 % (ref 11.5–14.5)
EST. GFR  (AFRICAN AMERICAN): 40 ML/MIN/1.73 M^2
EST. GFR  (NON AFRICAN AMERICAN): 34 ML/MIN/1.73 M^2
GLUCOSE SERPL-MCNC: 99 MG/DL (ref 70–110)
GLUCOSE UR QL STRIP: NEGATIVE
HCT VFR BLD AUTO: 34.7 % (ref 37–48.5)
HGB BLD-MCNC: 10.7 G/DL (ref 12–16)
HGB UR QL STRIP: ABNORMAL
IMM GRANULOCYTES # BLD AUTO: 0.09 K/UL (ref 0–0.04)
IMM GRANULOCYTES NFR BLD AUTO: 0.7 % (ref 0–0.5)
KETONES UR QL STRIP: NEGATIVE
LEUKOCYTE ESTERASE UR QL STRIP: ABNORMAL
LYMPHOCYTES # BLD AUTO: 1.4 K/UL (ref 1–4.8)
LYMPHOCYTES NFR BLD: 11 % (ref 18–48)
MCH RBC QN AUTO: 27.3 PG (ref 27–31)
MCHC RBC AUTO-ENTMCNC: 30.8 G/DL (ref 32–36)
MCV RBC AUTO: 89 FL (ref 82–98)
MICROSCOPIC COMMENT: ABNORMAL
MONOCYTES # BLD AUTO: 1.3 K/UL (ref 0.3–1)
MONOCYTES NFR BLD: 9.7 % (ref 4–15)
NEUTROPHILS # BLD AUTO: 9.8 K/UL (ref 1.8–7.7)
NEUTROPHILS NFR BLD: 75.9 % (ref 38–73)
NITRITE UR QL STRIP: NEGATIVE
NRBC BLD-RTO: 0 /100 WBC
PH UR STRIP: 7 [PH] (ref 5–8)
PLATELET # BLD AUTO: 185 K/UL (ref 150–350)
PMV BLD AUTO: 9 FL (ref 9.2–12.9)
POTASSIUM SERPL-SCNC: 4.5 MMOL/L (ref 3.5–5.1)
PROCALCITONIN SERPL IA-MCNC: 0.12 NG/ML
PROT UR QL STRIP: ABNORMAL
RBC # BLD AUTO: 3.92 M/UL (ref 4–5.4)
RBC #/AREA URNS HPF: >100 /HPF (ref 0–4)
SODIUM SERPL-SCNC: 139 MMOL/L (ref 136–145)
SP GR UR STRIP: <=1.005 (ref 1–1.03)
SQUAMOUS #/AREA URNS HPF: 1 /HPF
URN SPEC COLLECT METH UR: ABNORMAL
UROBILINOGEN UR STRIP-ACNC: NEGATIVE EU/DL
WBC # BLD AUTO: 12.95 K/UL (ref 3.9–12.7)
WBC #/AREA URNS HPF: 15 /HPF (ref 0–5)
WBC CLUMPS URNS QL MICRO: ABNORMAL

## 2019-11-07 PROCEDURE — 99291 PR CRITICAL CARE, E/M 30-74 MINUTES: ICD-10-PCS | Mod: ,,, | Performed by: INTERNAL MEDICINE

## 2019-11-07 PROCEDURE — 25000003 PHARM REV CODE 250: Performed by: INTERNAL MEDICINE

## 2019-11-07 PROCEDURE — 84145 PROCALCITONIN (PCT): CPT

## 2019-11-07 PROCEDURE — G0378 HOSPITAL OBSERVATION PER HR: HCPCS

## 2019-11-07 PROCEDURE — C1758 CATHETER, URETERAL: HCPCS | Performed by: UROLOGY

## 2019-11-07 PROCEDURE — 37000008 HC ANESTHESIA 1ST 15 MINUTES: Performed by: UROLOGY

## 2019-11-07 PROCEDURE — 36000706: Performed by: UROLOGY

## 2019-11-07 PROCEDURE — 81000 URINALYSIS NONAUTO W/SCOPE: CPT

## 2019-11-07 PROCEDURE — 25000003 PHARM REV CODE 250: Performed by: NURSE ANESTHETIST, CERTIFIED REGISTERED

## 2019-11-07 PROCEDURE — 63600175 PHARM REV CODE 636 W HCPCS: Performed by: NURSE ANESTHETIST, CERTIFIED REGISTERED

## 2019-11-07 PROCEDURE — 63600175 PHARM REV CODE 636 W HCPCS: Performed by: ANESTHESIOLOGY

## 2019-11-07 PROCEDURE — 37000009 HC ANESTHESIA EA ADD 15 MINS: Performed by: UROLOGY

## 2019-11-07 PROCEDURE — 74420 UROGRAPHY RTRGR +-KUB: CPT | Mod: 26,,, | Performed by: UROLOGY

## 2019-11-07 PROCEDURE — 63600175 PHARM REV CODE 636 W HCPCS: Performed by: UROLOGY

## 2019-11-07 PROCEDURE — 99204 OFFICE O/P NEW MOD 45 MIN: CPT | Mod: 25,,, | Performed by: UROLOGY

## 2019-11-07 PROCEDURE — 71000033 HC RECOVERY, INTIAL HOUR: Performed by: UROLOGY

## 2019-11-07 PROCEDURE — 36415 COLL VENOUS BLD VENIPUNCTURE: CPT

## 2019-11-07 PROCEDURE — 74420 PR  X-RAY RETROGRADE PYELOGRAM: ICD-10-PCS | Mod: 26,,, | Performed by: UROLOGY

## 2019-11-07 PROCEDURE — 25500020 PHARM REV CODE 255: Performed by: UROLOGY

## 2019-11-07 PROCEDURE — 63600175 PHARM REV CODE 636 W HCPCS: Performed by: NURSE PRACTITIONER

## 2019-11-07 PROCEDURE — 87088 URINE BACTERIA CULTURE: CPT

## 2019-11-07 PROCEDURE — 96376 TX/PRO/DX INJ SAME DRUG ADON: CPT | Mod: 59

## 2019-11-07 PROCEDURE — 99204 PR OFFICE/OUTPT VISIT, NEW, LEVL IV, 45-59 MIN: ICD-10-PCS | Mod: 25,,, | Performed by: UROLOGY

## 2019-11-07 PROCEDURE — 25000003 PHARM REV CODE 250: Performed by: NURSE PRACTITIONER

## 2019-11-07 PROCEDURE — A4216 STERILE WATER/SALINE, 10 ML: HCPCS | Performed by: ANESTHESIOLOGY

## 2019-11-07 PROCEDURE — 80048 BASIC METABOLIC PNL TOTAL CA: CPT

## 2019-11-07 PROCEDURE — 52332 PR CYSTOSCOPY,INSERT URETERAL STENT: ICD-10-PCS | Mod: RT,,, | Performed by: UROLOGY

## 2019-11-07 PROCEDURE — 96361 HYDRATE IV INFUSION ADD-ON: CPT

## 2019-11-07 PROCEDURE — 85025 COMPLETE CBC W/AUTO DIFF WBC: CPT

## 2019-11-07 PROCEDURE — C2617 STENT, NON-COR, TEM W/O DEL: HCPCS | Performed by: UROLOGY

## 2019-11-07 PROCEDURE — C1769 GUIDE WIRE: HCPCS | Performed by: UROLOGY

## 2019-11-07 PROCEDURE — 52332 CYSTOSCOPY AND TREATMENT: CPT | Mod: RT,,, | Performed by: UROLOGY

## 2019-11-07 PROCEDURE — 87106 FUNGI IDENTIFICATION YEAST: CPT

## 2019-11-07 PROCEDURE — 00910 ANES TRANSURETHRAL PX NOS: CPT | Performed by: UROLOGY

## 2019-11-07 PROCEDURE — 36000707: Performed by: UROLOGY

## 2019-11-07 PROCEDURE — 25000003 PHARM REV CODE 250: Performed by: UROLOGY

## 2019-11-07 PROCEDURE — 25000003 PHARM REV CODE 250: Performed by: ANESTHESIOLOGY

## 2019-11-07 PROCEDURE — 99291 CRITICAL CARE FIRST HOUR: CPT | Mod: ,,, | Performed by: INTERNAL MEDICINE

## 2019-11-07 PROCEDURE — 87086 URINE CULTURE/COLONY COUNT: CPT

## 2019-11-07 DEVICE — STENT URETERAL UNIV 6FR 26CM
Type: IMPLANTABLE DEVICE | Site: URETER | Status: NON-FUNCTIONAL
Removed: 2019-12-05

## 2019-11-07 RX ORDER — HYDROMORPHONE HYDROCHLORIDE 2 MG/ML
0.2 INJECTION, SOLUTION INTRAMUSCULAR; INTRAVENOUS; SUBCUTANEOUS EVERY 5 MIN PRN
Status: DISCONTINUED | OUTPATIENT
Start: 2019-11-07 | End: 2019-11-08

## 2019-11-07 RX ORDER — DIPHENHYDRAMINE HYDROCHLORIDE 50 MG/ML
25 INJECTION INTRAMUSCULAR; INTRAVENOUS EVERY 6 HOURS PRN
Status: DISCONTINUED | OUTPATIENT
Start: 2019-11-07 | End: 2019-11-08 | Stop reason: HOSPADM

## 2019-11-07 RX ORDER — METOCLOPRAMIDE HYDROCHLORIDE 5 MG/ML
10 INJECTION INTRAMUSCULAR; INTRAVENOUS EVERY 10 MIN PRN
Status: DISCONTINUED | OUTPATIENT
Start: 2019-11-07 | End: 2019-11-08 | Stop reason: HOSPADM

## 2019-11-07 RX ORDER — SODIUM CHLORIDE 0.9 % (FLUSH) 0.9 %
3 SYRINGE (ML) INJECTION EVERY 8 HOURS
Status: DISCONTINUED | OUTPATIENT
Start: 2019-11-07 | End: 2019-11-08 | Stop reason: HOSPADM

## 2019-11-07 RX ORDER — PROPOFOL 10 MG/ML
VIAL (ML) INTRAVENOUS
Status: DISCONTINUED | OUTPATIENT
Start: 2019-11-07 | End: 2019-11-07

## 2019-11-07 RX ORDER — SODIUM CHLORIDE, SODIUM LACTATE, POTASSIUM CHLORIDE, CALCIUM CHLORIDE 600; 310; 30; 20 MG/100ML; MG/100ML; MG/100ML; MG/100ML
INJECTION, SOLUTION INTRAVENOUS CONTINUOUS PRN
Status: DISCONTINUED | OUTPATIENT
Start: 2019-11-07 | End: 2019-11-07

## 2019-11-07 RX ORDER — FENTANYL CITRATE 50 UG/ML
INJECTION, SOLUTION INTRAMUSCULAR; INTRAVENOUS
Status: DISCONTINUED | OUTPATIENT
Start: 2019-11-07 | End: 2019-11-07

## 2019-11-07 RX ORDER — MEPERIDINE HYDROCHLORIDE 50 MG/ML
12.5 INJECTION INTRAMUSCULAR; INTRAVENOUS; SUBCUTANEOUS ONCE AS NEEDED
Status: DISCONTINUED | OUTPATIENT
Start: 2019-11-07 | End: 2019-11-08

## 2019-11-07 RX ORDER — HYDROMORPHONE HYDROCHLORIDE 2 MG/ML
0.2 INJECTION, SOLUTION INTRAMUSCULAR; INTRAVENOUS; SUBCUTANEOUS EVERY 5 MIN PRN
Status: COMPLETED | OUTPATIENT
Start: 2019-11-07 | End: 2019-11-07

## 2019-11-07 RX ORDER — LIDOCAINE HYDROCHLORIDE 10 MG/ML
INJECTION, SOLUTION EPIDURAL; INFILTRATION; INTRACAUDAL; PERINEURAL
Status: DISCONTINUED | OUTPATIENT
Start: 2019-11-07 | End: 2019-11-07

## 2019-11-07 RX ORDER — MIDAZOLAM HYDROCHLORIDE 1 MG/ML
INJECTION, SOLUTION INTRAMUSCULAR; INTRAVENOUS
Status: DISCONTINUED | OUTPATIENT
Start: 2019-11-07 | End: 2019-11-07

## 2019-11-07 RX ADMIN — MIDAZOLAM 2 MG: 1 INJECTION INTRAMUSCULAR; INTRAVENOUS at 04:11

## 2019-11-07 RX ADMIN — SODIUM CHLORIDE: 0.9 INJECTION, SOLUTION INTRAVENOUS at 06:11

## 2019-11-07 RX ADMIN — POTASSIUM CITRATE 10 MEQ: 10 TABLET ORAL at 09:11

## 2019-11-07 RX ADMIN — FENTANYL CITRATE 100 MCG: 50 INJECTION, SOLUTION INTRAMUSCULAR; INTRAVENOUS at 04:11

## 2019-11-07 RX ADMIN — PROPOFOL 50 MG: 10 INJECTION, EMULSION INTRAVENOUS at 04:11

## 2019-11-07 RX ADMIN — HYDROMORPHONE HYDROCHLORIDE 0.2 MG: 2 INJECTION INTRAMUSCULAR; INTRAVENOUS; SUBCUTANEOUS at 05:11

## 2019-11-07 RX ADMIN — ACETAMINOPHEN 1000 MG: 500 TABLET, FILM COATED ORAL at 08:11

## 2019-11-07 RX ADMIN — PROPOFOL 70 MG: 10 INJECTION, EMULSION INTRAVENOUS at 04:11

## 2019-11-07 RX ADMIN — CEFTRIAXONE 2 G: 2 INJECTION, SOLUTION INTRAVENOUS at 12:11

## 2019-11-07 RX ADMIN — ACETAMINOPHEN 1000 MG: 500 TABLET, FILM COATED ORAL at 09:11

## 2019-11-07 RX ADMIN — TAMSULOSIN HYDROCHLORIDE 0.4 MG: 0.4 CAPSULE ORAL at 09:11

## 2019-11-07 RX ADMIN — SODIUM CHLORIDE, SODIUM LACTATE, POTASSIUM CHLORIDE, AND CALCIUM CHLORIDE: 600; 310; 30; 20 INJECTION, SOLUTION INTRAVENOUS at 04:11

## 2019-11-07 RX ADMIN — Medication 3 ML: at 11:11

## 2019-11-07 RX ADMIN — PROPOFOL 30 MG: 10 INJECTION, EMULSION INTRAVENOUS at 04:11

## 2019-11-07 RX ADMIN — LIDOCAINE HYDROCHLORIDE 50 MG: 10 INJECTION, SOLUTION EPIDURAL; INFILTRATION; INTRACAUDAL; PERINEURAL at 04:11

## 2019-11-07 NOTE — CONSULTS
Chief Complaint: Right ureteral stone    HPI:   11/7/19: 24 yo woman presented to ER 2d ago with right ureteral stone and low suspicion for infection but after observation she has continued low grade fever and likely has a pyelonephritis present on admission that has not fully developed into a worse problem yet.  Has not had prior stones.  Was in a rehab facility for the last month and at the end of the 28 day program was advised to come to the ER for right flank pain.  This pain has been well controlled.    Allergies:  Patient has no known allergies.    Medications: see MAR    Review of Systems:  General: No fever, chills, fatigability, or weight loss.  Skin: No rashes, itching, or changes in color or texture of skin.  Chest: Denies ELAM, cyanosis, wheezing, cough, and sputum production.  Abdomen: Appetite fine. No weight loss. Denies diarrhea, abdominal pain, hematemesis, or blood in stool.  Musculoskeletal: No joint stiffness or swelling. Denies back pain.  : As above.  All other review of systems negative.    PMH:   has a past medical history of HCV (hepatitis C virus) and IV drug abuse.    PSH:   has no past surgical history on file.    FamHx: family history includes Heart disease in her maternal grandfather.    SocHx:  reports that she has never smoked. She has never used smokeless tobacco. She reports that she has current or past drug history. She reports that she does not drink alcohol.     Physical Exam:  Vitals:   Vitals:    11/07/19 0912   BP:    Pulse:    Resp:    Temp: 98.8 °F (37.1 °C)     General: A&Ox3. No apparent distress. No deformities.  Neck: No masses. Normal thyroid.  Lungs: normal inspiration. No use of accessory muscles.  Heart: normal pulse. No arrhythmias.  Abdomen: Soft. NT. ND. No masses. No hernias. No hepatosplenomegaly.  Lymphatic: Neck and groin nodes negative.  Skin: The skin is warm and dry. No jaundice.  Ext: No c/c/e.  : deferred    Labs/Studies: see chart    Impression/Plan:    1. To OR later today for cysto and right ureteral stent.  Risks/benefits reviewed in detail.

## 2019-11-07 NOTE — TRANSFER OF CARE
"Anesthesia Transfer of Care Note    Patient: Neha Vázquez    Procedure(s) Performed: Procedure(s) (LRB):  CYSTOSCOPY, WITH URETERAL STENT INSERTION (Right)  PYELOGRAM, RETROGRADE (N/A)    Patient location: PACU    Anesthesia Type: general    Transport from OR: Transported from OR on room air with adequate spontaneous ventilation    Post pain: adequate analgesia    Post assessment: no apparent anesthetic complications and tolerated procedure well    Post vital signs: stable    Level of consciousness: awake    Nausea/Vomiting: no nausea/vomiting    Complications: none    Transfer of care protocol was followed      Last vitals:   Visit Vitals  /66 (BP Location: Left arm, Patient Position: Lying)   Pulse 94   Temp 36.9 °C (98.4 °F) (Temporal)   Resp 14   Ht 5' 5" (1.651 m)   Wt 105.2 kg (231 lb 14.8 oz)   SpO2 99%   Breastfeeding? No   BMI 38.59 kg/m²     "

## 2019-11-07 NOTE — ANESTHESIA PREPROCEDURE EVALUATION
11/07/2019  Neha Vázquez is a 23 y.o., female.    Pre-op Assessment    I have reviewed the Patient Summary Reports.     I have reviewed the Medications.     Review of Systems  Anesthesia Hx:  No problems with previous Anesthesia   Denies Personal Hx of Anesthesia complications.   Social:  Non-Smoker    Hematology/Oncology:  Hematology Normal   Oncology Normal     EENT/Dental:EENT/Dental Normal   Cardiovascular:  Cardiovascular Normal Exercise tolerance: good     Pulmonary:  Pulmonary Normal    Renal/:   Chronic Renal Disease, ARF renal calculi    Hepatic/GI:   Liver Disease, Hepatitis, C    Musculoskeletal:  Musculoskeletal Normal    Neurological:  Neurology Normal    Endocrine:  Endocrine Normal    Dermatological:  Skin Normal    Psych:  Psychiatric Normal  IV Drug Abuse         Physical Exam  General:  Well nourished    Airway/Jaw/Neck:  Airway Findings: Mouth Opening: Normal Tongue: Normal  Mallampati: II      Dental:  Dental Findings: In tact   Chest/Lungs:  Chest/Lungs Clear    Heart/Vascular:  Heart Findings: Normal Heart murmur: negative       Mental Status:  Mental Status Findings:  Cooperative, Alert and Oriented         Anesthesia Plan  Type of Anesthesia, risks & benefits discussed:  Anesthesia Type:  general  Patient's Preference:   Intra-op Monitoring Plan: standard ASA monitors  Intra-op Monitoring Plan Comments:   Post Op Pain Control Plan: multimodal analgesia  Post Op Pain Control Plan Comments:   Induction:   IV  Beta Blocker:  Patient is not currently on a Beta-Blocker (No further documentation required).       Informed Consent: Patient understands risks and agrees with Anesthesia plan.  Questions answered. Anesthesia consent signed with patient.  ASA Score: 3     Day of Surgery Review of History & Physical: I have interviewed and examined the patient. I have reviewed the  patient's H&P dated:    H&P update referred to the surgeon.

## 2019-11-07 NOTE — PROGRESS NOTES
[facility] Hospital Medicine  Progress Note    Patient Name: Neha Vázquez  MRN: [id number]  Patient Class: OP- Observation   Admission Date: 11/5/2019  Length of Stay: 0 days  Attending Physician: Segun Brewer MD  Primary Care Provider: Provider Notinsystem        Subjective:     Principal Problem:Ureterolithiasis        HPI:  Neha Vázquez is a 23 y.o. female patient with a PMHx of Nephrolithiasis, HCV and IV drug abuse (she is in sober living house) who presents to the Emergency Department for evaluation of R sided flank pain which onset gradually several days ago with associated nausea.  She was seen at [facility] 2 nights ago for same symptoms and dx with kidney stone.  While at [facility] ED she received IV Toradol and a prescription for Toradol. She presents due to continued pain. She denies fever, chills, vomiting, SOB, hematuria and other symptoms. Vital signs on arrival: normal. Labs find a mild leukocytosis, CO 2 = 20, creatinine 2.4 (11/3 - 1.01) with GFR 28. Renal stone study was significant for mild right hydroureteronephrosis secondary to a 5 x 5 mm right distal ureteral calculus.  Right renal edema and perinephric inflammatory stranding.  Interval development of diffuse left renal cortical scarring.  No evidence of left hydronephrosis.    Overview/Hospital Course:  On 11/5 patient was placed in Observation secondary to JAZMINE thought to be r/t NSAID and Toradol use.  Initial Cr elevated to 2.4 (1.01 on 11/3 at [facility]).  Renal CT showed 5 x 5 mm right distal ureteral calculus resulting in mild right hydronephrosis.  Per d/w Urology, no intervention required at this time and patient was started on Flomax.  Patient was also started on IVFs and Nephrology consulted to assist with care.      As of 11/6 patient reports improvement in right side flank pain and reports good UOP.  Still has not passed calculi.  She is drinking fluids appropriately and IVFs continue.  Repeat Cr today  improved to 2.0. Case d/w Dr. Yin.  Will repeat KUB today to re-evaluate right sided calculus and increase IVFs to 150cc/hour.  Per nephrology recs, will also start on low dose HCTZ qod and K citrate BID.  Plan to continue IVFs overnight and re-assess Cr in the AM.      11/7 - Pt spiked a temp of 101.4 this AM. IV Rocephin started. Urine culture was negative. Pt to go to OR for cysto and right ureteral stent by Urology.Pt states she is still having right sided flank pain. Denies any difficulty urinating. Denies nausea/vomiting.         Review of Systems   Constitutional: Negative for chills.   HENT: Negative.    Respiratory: Negative.    Cardiovascular: Negative.    Gastrointestinal: Negative for abdominal pain, constipation, diarrhea, nausea and vomiting.   Genitourinary: Positive for flank pain. Negative for decreased urine volume, difficulty urinating and frequency.   Neurological: Negative.    Hematological: Negative.    Psychiatric/Behavioral: Negative.      Objective:     Vital Signs (Most Recent):  Temp: 98.4 °F (36.9 °C) (11/07/19 1703)  Pulse: 90 (11/07/19 1710)  Resp: 20 (11/07/19 1710)  BP: 109/63 (11/07/19 1710)  SpO2: 100 % (11/07/19 1710) Vital Signs (24h Range):  Temp:  [98.4 °F (36.9 °C)-101.4 °F (38.6 °C)] 98.4 °F (36.9 °C)  Pulse:  [] 90  Resp:  [14-20] 20  SpO2:  [95 %-100 %] 100 %  BP: (106-131)/(57-73) 109/63     Weight: 105.2 kg (231 lb 14.8 oz)  Body mass index is 38.59 kg/m².    Intake/Output Summary (Last 24 hours) at 11/7/2019 1712  Last data filed at 11/7/2019 1703  Gross per 24 hour   Intake 4193.75 ml   Output 350 ml   Net 3843.75 ml      Physical Exam   Constitutional: She is oriented to person, place, and time. She appears well-developed and well-nourished.   HENT:   Head: Normocephalic and atraumatic.   Nose: Nose normal.   Mouth/Throat: Oropharynx is clear and moist.   Eyes: Pupils are equal, round, and reactive to light. Conjunctivae are normal. No scleral icterus.   Neck:  Normal range of motion. Neck supple.   Cardiovascular: Normal rate, regular rhythm and normal heart sounds. Exam reveals no gallop and no friction rub.   No murmur heard.  Pulmonary/Chest: Effort normal and breath sounds normal.   Abdominal: Soft. Bowel sounds are normal.   Musculoskeletal: Normal range of motion. She exhibits no edema or tenderness.   Neurological: She is alert and oriented to person, place, and time.   Skin: Skin is warm and dry.   Psychiatric: She has a normal mood and affect. Her behavior is normal.   Vitals reviewed.      Significant Labs:   CBC:   Recent Labs   Lab 11/07/19  0439   WBC 12.95*   HGB 10.7*   HCT 34.7*        CMP:   Recent Labs   Lab 11/06/19 0729 11/07/19  0439    139   K 3.9 4.5    108   CO2 17* 21*   GLU 83 99   BUN 21* 19   CREATININE 2.0* 2.0*   CALCIUM 8.2* 9.1   ANIONGAP 10 10   EGFRNONAA 34* 34*     All pertinent labs within the past 24 hours have been reviewed.    Significant Imaging: I have reviewed all pertinent imaging results/findings within the past 24 hours.      Assessment/Plan:      * Ureterolithiasis  - Per Urology no intervention needed at this time as 11/6  - Continue Flomax  - Continue IV fluids and encouraged oral intake  - Strain urine to monitor for stone  - Repeat KUB pending  - Non NSAID analgesia  - Started on HCTZ qod and K citrate BID  - Will need to Follow up with Dr. Lam at [facility] upon DC    JAZMINE (acute kidney injury)  - Placed in OBS  - Nephrology following; JAZMINE is secondary to acute analgesic nephropathy due to Toradol and NSAIDs  - Continue IV fluids  - Avoid nephrotoxic meds  - Strict I and O  - Repeat chemistries in AM      Hydronephrosis  Renal US show right sided hydronephrosis  Spiked a temp this AM - NPO now as pt to go to OR for right ureteral stent      Leukocytosis  - WBC 12.79 in ED.   At [facility] on 11/3 WBC = 12.8  - U/A indicates RBC and + 1 leukocytes  - Given empiric Rocephin and Rocephin restarted when pt  spiked a temp  - UC is no growth  - BC show NGTD  - Daily CBC  Spiked a temp this AM of 101.4 - restarted Rocephin, urine culture negative. Urology to place ureteral stent        VTE Risk Mitigation (From admission, onward)         Ordered     Place sequential compression device  Until discontinued      11/06/19 2493                      Stephanie Howard NP  Department of Hospital Medicine   [facility]

## 2019-11-07 NOTE — SUBJECTIVE & OBJECTIVE
Interval History: Pt was seen and examined this am. Discussed with Dr. Brewer, PCP. Also discussed with urologist, Dr. Rosales. Pt spiked a fever last pm at 101.4. This am, pt has intermittent right sided pain, o/w no new c/o's. Urine is not cloudy, no dysuria, no SOB.    Review of patient's allergies indicates:  No Known Allergies  Current Facility-Administered Medications   Medication Frequency    0.9%  NaCl infusion Continuous    acetaminophen tablet 1,000 mg Q6H PRN    hydroCHLOROthiazide tablet 12.5 mg Every other day    influenza (QUADRIVALENT PF) vaccine 0.5 mL vaccine x 1 dose    ondansetron injection 4 mg Q8H PRN    potassium citrate SR tablet 10 mEq Daily    promethazine (PHENERGAN) 12.5 mg in dextrose 5 % 50 mL IVPB Q6H PRN    tamsulosin 24 hr capsule 0.4 mg Daily       Objective:     Vital Signs (Most Recent):  Temp: 98.8 °F (37.1 °C) (11/07/19 0912)  Pulse: 107 (11/07/19 0703)  Resp: 18 (11/07/19 0703)  BP: 131/73 (11/07/19 0703)  SpO2: 95 % (11/07/19 0703)  O2 Device (Oxygen Therapy): room air (11/07/19 0705) Vital Signs (24h Range):  Temp:  [98.8 °F (37.1 °C)-101.4 °F (38.6 °C)] 98.8 °F (37.1 °C)  Pulse:  [] 107  Resp:  [18-20] 18  SpO2:  [95 %-100 %] 95 %  BP: (106-131)/(57-73) 131/73     Weight: 105.2 kg (231 lb 14.8 oz) (11/05/19 0926)  Body mass index is 38.59 kg/m².  Body surface area is 2.2 meters squared.    I/O last 3 completed shifts:  In: 5153.3 [P.O.:200; I.V.:4953.3]  Out: 950 [Urine:950]    Physical Exam   Constitutional: She is oriented to person, place, and time. She appears well-developed and well-nourished. No distress.   HENT:   Head: Normocephalic and atraumatic.   Neck: No JVD present.   Cardiovascular: Normal rate, regular rhythm and normal heart sounds. Exam reveals no friction rub.   Pulmonary/Chest: Effort normal and breath sounds normal. No stridor. No respiratory distress. She has no wheezes. She has no rales.   Abdominal: Soft. Bowel sounds are normal. She  exhibits no distension. There is no tenderness. There is no guarding.   Musculoskeletal: She exhibits no edema.   Neurological: She is alert and oriented to person, place, and time.   Skin: Skin is warm and dry.   Psychiatric: She has a normal mood and affect. Her behavior is normal. Thought content normal.   Nursing note and vitals reviewed.      Significant Labs: reviewed  BMP  Lab Results   Component Value Date     11/07/2019    K 4.5 11/07/2019     11/07/2019    CO2 21 (L) 11/07/2019    BUN 19 11/07/2019    CREATININE 2.0 (H) 11/07/2019    CALCIUM 9.1 11/07/2019    ANIONGAP 10 11/07/2019    ESTGFRAFRICA 40 (A) 11/07/2019    EGFRNONAA 34 (A) 11/07/2019     Lab Results   Component Value Date    WBC 12.95 (H) 11/07/2019    HGB 10.7 (L) 11/07/2019    HCT 34.7 (L) 11/07/2019    MCV 89 11/07/2019     11/07/2019       Blood cx neg  Urine cx neg      Significant Imaging: reviewed renal u/s:

## 2019-11-07 NOTE — PLAN OF CARE
No adverse events, free from falls, safety measures maintained, pain partially controlled with po prn medication, POC reviewed, call bell in reach bed low and locked, chart reviewed

## 2019-11-07 NOTE — OP NOTE
Date of Procedure: 11/07/2019    Pre-operative Diagnosis:   1.  Right ureteral stone    Post-operative Diagnosis:   1.  same    Surgeon: CAL Rosales MD    Assistants: None    Specimen: None    Prosthetics, Devices, Grafts: None    Anesthesia: MAC    Procedures:  1. Cysto with placement of right ureteral stent  2. Right retrograde pyelogram  3. Fluoro less than one hour    Procedure in Detail:  After proper consents were obtained, the patient was prepped and draped in normal sterile fashion in the dorsal lithotomy position.  The 22 Fr resectascope was assembled and introduced per urethra.  The bladder was inspected.  A 5Fr open ended catheter with angle glide wire was passed to the right renal pelvis.  Using the catheter a right retrograde pyelogram was performed with half-dilute water soluble contrast; calyces were mildly blunted otherwise normal study.  A guide wire was passed to the right renal pelvis and a new stent was then passed over the wire and when the wire was withdrawn fluoroscopy confirmed good placement with a curl in the stent on both ends.  The cystoscope was then reinserted to the bladder which was drained and the scope was then withdrawn and set aside.    Blood Loss: none    Fluids: Per anesthesia.    Drains: 6x26cm right ureteral stent    Complications: None.

## 2019-11-07 NOTE — SUBJECTIVE & OBJECTIVE
Review of Systems   Constitutional: Negative for chills.   HENT: Negative.    Respiratory: Negative.    Cardiovascular: Negative.    Gastrointestinal: Negative for abdominal pain, constipation, diarrhea, nausea and vomiting.   Genitourinary: Positive for flank pain. Negative for decreased urine volume, difficulty urinating and frequency.   Neurological: Negative.    Hematological: Negative.    Psychiatric/Behavioral: Negative.      Objective:     Vital Signs (Most Recent):  Temp: 98.4 °F (36.9 °C) (11/07/19 1703)  Pulse: 90 (11/07/19 1710)  Resp: 20 (11/07/19 1710)  BP: 109/63 (11/07/19 1710)  SpO2: 100 % (11/07/19 1710) Vital Signs (24h Range):  Temp:  [98.4 °F (36.9 °C)-101.4 °F (38.6 °C)] 98.4 °F (36.9 °C)  Pulse:  [] 90  Resp:  [14-20] 20  SpO2:  [95 %-100 %] 100 %  BP: (106-131)/(57-73) 109/63     Weight: 105.2 kg (231 lb 14.8 oz)  Body mass index is 38.59 kg/m².    Intake/Output Summary (Last 24 hours) at 11/7/2019 1712  Last data filed at 11/7/2019 1703  Gross per 24 hour   Intake 4193.75 ml   Output 350 ml   Net 3843.75 ml      Physical Exam   Constitutional: She is oriented to person, place, and time. She appears well-developed and well-nourished.   HENT:   Head: Normocephalic and atraumatic.   Nose: Nose normal.   Mouth/Throat: Oropharynx is clear and moist.   Eyes: Pupils are equal, round, and reactive to light. Conjunctivae are normal. No scleral icterus.   Neck: Normal range of motion. Neck supple.   Cardiovascular: Normal rate, regular rhythm and normal heart sounds. Exam reveals no gallop and no friction rub.   No murmur heard.  Pulmonary/Chest: Effort normal and breath sounds normal.   Abdominal: Soft. Bowel sounds are normal.   Musculoskeletal: Normal range of motion. She exhibits no edema or tenderness.   Neurological: She is alert and oriented to person, place, and time.   Skin: Skin is warm and dry.   Psychiatric: She has a normal mood and affect. Her behavior is normal.   Vitals  reviewed.      Significant Labs:   CBC:   Recent Labs   Lab 11/07/19 0439   WBC 12.95*   HGB 10.7*   HCT 34.7*        CMP:   Recent Labs   Lab 11/06/19 0729 11/07/19 0439    139   K 3.9 4.5    108   CO2 17* 21*   GLU 83 99   BUN 21* 19   CREATININE 2.0* 2.0*   CALCIUM 8.2* 9.1   ANIONGAP 10 10   EGFRNONAA 34* 34*     All pertinent labs within the past 24 hours have been reviewed.    Significant Imaging: I have reviewed all pertinent imaging results/findings within the past 24 hours.

## 2019-11-07 NOTE — ASSESSMENT & PLAN NOTE
22 y/o female with JAZMINE and a kidney stone:     JAZMINE (acute kidney injury)  s Cr improved, lower  JAZMINE resolving. JAZMINE due to toradol use  Acute analgesic nephropathy  No relation to the unilateral kidney stone   K normal  Metabolic acidosis, mild, stable  Pt has been advised about risk of kidney damage with NSAIds     Ureterolithiasis  Stone is visible both on KUB and CT scan  Likely a calcium based or combination stone   Has dietary risk factors for both Ca oxalate and uric acid stones  U/a unremarkable  No infections suspected, struvite stones not suspected  Mild hydronephrosis  Noted mild increase in iPTH, primary hyperparathyroidism may be present, though ca not high (would be a risk factor for nephrolithiasis)     Continue NS IVF's, received boluses in ER  Continue flomax 0.4 mg po qd to dilate the ureter  Non-NSAIDs based pain relief (however, note that pt has a h/o of narcotic abuse and was in drug rehab)  Advised pt again of dietary risk factors for kidney stones  K citrate 5 mEq po bid to tid or freshly squeezed lemon juice daily  HCTZ 12.5 mg po qd to reduce hypercalciuria  Pt later can do a 24 hour urine collection for stone risk stratification.        Plans and recommendations:  As discussed above  Discussed with pt, opportunity for questions provided  Increase NS IVF rate to 200 ml/hr  Ambulate pt

## 2019-11-07 NOTE — PROGRESS NOTES
[facility] -   Nephrology  Progress Note    Patient Name: Neha Vázquez  MRN: [id number]  Admission Date: 11/5/2019  Hospital Length of Stay: 0 days  Attending Provider: Segun Brewer MD   Primary Care Physician: Provider Notinsystem  Principal Problem:JAZMINE (acute kidney injury)    Subjective:     HPI: Pt was seen and examined. Chart and h/o reviewed. Pt is a 22 y/o female who presented to ER for right sided flank pain. Pt has a h/o of nephrolithiasis x 5 years. Noted that she has a s Cr is 2.4, was normal 2 months ago. The stones have occurred in both left and right side. She previously saw a nephrologist and neglected to complete the w/u. She has never the stones, the stones have never been analyzed. Pt presented to [facility] ER 2 days ago for right sided pain, a stone was identified, per pt, in the right side. Pt was give IVF's, toradol injection, and was discharged home with a Rx for toradol tablets 10 mg bid, which she has taken in the past 2 days. The pain is in the right side, extending to the groin. Pt denies fever, no cloudy urine. No pain in left side. Pt has significant dietary risk factors for stone formation, including very salty diet, excessive meat intake, and not drinking adequate water. Discussed with urology.     Interval History: Pt was seen and examined this am. Discussed with Dr. Brewer, PCP. Also discussed with urologist, Dr. Rosales. Pt spiked a fever last pm at 101.4. This am, pt has intermittent right sided pain, o/w no new c/o's. Urine is not cloudy, no dysuria, no SOB.    Review of patient's allergies indicates:  No Known Allergies  Current Facility-Administered Medications   Medication Frequency    0.9%  NaCl infusion Continuous    acetaminophen tablet 1,000 mg Q6H PRN    hydroCHLOROthiazide tablet 12.5 mg Every other day    influenza (QUADRIVALENT PF) vaccine 0.5 mL vaccine x 1 dose    ondansetron injection 4 mg Q8H PRN    potassium citrate SR tablet 10 mEq Daily     promethazine (PHENERGAN) 12.5 mg in dextrose 5 % 50 mL IVPB Q6H PRN    tamsulosin 24 hr capsule 0.4 mg Daily       Objective:     Vital Signs (Most Recent):  Temp: 98.8 °F (37.1 °C) (11/07/19 0912)  Pulse: 107 (11/07/19 0703)  Resp: 18 (11/07/19 0703)  BP: 131/73 (11/07/19 0703)  SpO2: 95 % (11/07/19 0703)  O2 Device (Oxygen Therapy): room air (11/07/19 0705) Vital Signs (24h Range):  Temp:  [98.8 °F (37.1 °C)-101.4 °F (38.6 °C)] 98.8 °F (37.1 °C)  Pulse:  [] 107  Resp:  [18-20] 18  SpO2:  [95 %-100 %] 95 %  BP: (106-131)/(57-73) 131/73     Weight: 105.2 kg (231 lb 14.8 oz) (11/05/19 0926)  Body mass index is 38.59 kg/m².  Body surface area is 2.2 meters squared.    I/O last 3 completed shifts:  In: 5153.3 [P.O.:200; I.V.:4953.3]  Out: 950 [Urine:950]    Physical Exam   Constitutional: She is oriented to person, place, and time. She appears well-developed and well-nourished. No distress.   HENT:   Head: Normocephalic and atraumatic.   Neck: No JVD present.   Cardiovascular: Normal rate, regular rhythm and normal heart sounds. Exam reveals no friction rub.   Pulmonary/Chest: Effort normal and breath sounds normal. No stridor. No respiratory distress. She has no wheezes. She has no rales.   Abdominal: Soft. Bowel sounds are normal. She exhibits no distension. There is no tenderness. There is no guarding.   Musculoskeletal: She exhibits no edema.   Neurological: She is alert and oriented to person, place, and time.   Skin: Skin is warm and dry.   Psychiatric: She has a normal mood and affect. Her behavior is normal. Thought content normal.   Nursing note and vitals reviewed.      Significant Labs: reviewed  BMP  Lab Results   Component Value Date     11/07/2019    K 4.5 11/07/2019     11/07/2019    CO2 21 (L) 11/07/2019    BUN 19 11/07/2019    CREATININE 2.0 (H) 11/07/2019    CALCIUM 9.1 11/07/2019    ANIONGAP 10 11/07/2019    ESTGFRAFRICA 40 (A) 11/07/2019    EGFRNONAA 34 (A) 11/07/2019     Lab  Results   Component Value Date    WBC 12.95 (H) 11/07/2019    HGB 10.7 (L) 11/07/2019    HCT 34.7 (L) 11/07/2019    MCV 89 11/07/2019     11/07/2019       Blood cx neg  Urine cx neg      Significant Imaging: reviewed renal u/s:     Assessment/Plan:     24 y/o female with JAZMINE and a kidney stone:     JAZMINE (acute kidney injury)  s Cr improved since admit, but not more than yesterday  Renal failure stable, not worse  JAZMINE due to toradol use  Acute analgesic nephropathy  No relation to the unilateral kidney stone   K normal  Metabolic acidosis, mild, stable  Pt has been advised about risk of kidney damage with NSAIds     Ureterolithiasis  The 5 mm right sided stones has not passed  Pt had a fever spike  Pyelonephritis was considered, but renal u/s was unremarkable  With stone unpassed, pt remains at risk of infections  Mild hydronephrosis  Consulted and discussed with urology further urologic intervention  Pt will have cystoscopy and stent placement done today    To review: Likely a calcium based or combination stone as pt visible on both KUB and u/s  Has dietary risk factors for both Ca oxalate and uric acid stones  U/a unremarkable, will repeat  Noted mild increase in iPTH, primary hyperparathyroidism may be present, though ca not high (would be a risk factor for nephrolithiasis)     Continue NS IVF's at 150 ml/hr  Continue flomax 0.4 mg po qd to dilate the ureter  Non-NSAIDs based pain relief (however, note that pt has a h/o of narcotic abuse and was in drug rehab)  Advised pt again of dietary risk factors for kidney stones  Continue K citrate 5 mEq po bid to tid or freshly squeezed lemon juice daily when at home  Continue HCTZ 12.5 mg po qd to reduce hypercalciuria  Pt later can do a 24 hour urine collection for stone risk stratification.        Plans and recommendations:  As discussed above  Urologic intervention as above  Discussed with pt, opportunity for questions provided  complex case, extensive time spent  today  Total critical care quality time spent 40 min         Lizet Yin MD  Nephrology  [facility]

## 2019-11-07 NOTE — ASSESSMENT & PLAN NOTE
- Per Urology no intervention needed at this time as 11/6  - Continue Flomax  - Continue IV fluids and encouraged oral intake  - Strain urine to monitor for stone  - Repeat KUB pending  - Non NSAID analgesia  - Started on HCTZ qod and K citrate BID  - Will need to Follow up with Dr. Lam at [facility] upon DC

## 2019-11-07 NOTE — ASSESSMENT & PLAN NOTE
Renal US show right sided hydronephrosis  Spiked a temp this AM - NPO now as pt to go to OR for right ureteral stent

## 2019-11-08 ENCOUNTER — TELEPHONE (OUTPATIENT)
Dept: UROLOGY | Facility: CLINIC | Age: 24
End: 2019-11-08

## 2019-11-08 VITALS
RESPIRATION RATE: 16 BRPM | BODY MASS INDEX: 38.64 KG/M2 | WEIGHT: 231.94 LBS | OXYGEN SATURATION: 98 % | HEIGHT: 65 IN | TEMPERATURE: 98 F | DIASTOLIC BLOOD PRESSURE: 61 MMHG | SYSTOLIC BLOOD PRESSURE: 124 MMHG | HEART RATE: 79 BPM

## 2019-11-08 PROBLEM — N17.9 AKI (ACUTE KIDNEY INJURY): Status: RESOLVED | Noted: 2019-11-05 | Resolved: 2019-11-08

## 2019-11-08 PROBLEM — D72.829 LEUKOCYTOSIS: Status: RESOLVED | Noted: 2019-11-05 | Resolved: 2019-11-08

## 2019-11-08 LAB
ANION GAP SERPL CALC-SCNC: 12 MMOL/L (ref 8–16)
BUN SERPL-MCNC: 12 MG/DL (ref 6–20)
CALCIUM SERPL-MCNC: 8.9 MG/DL (ref 8.7–10.5)
CHLORIDE SERPL-SCNC: 107 MMOL/L (ref 95–110)
CO2 SERPL-SCNC: 20 MMOL/L (ref 23–29)
CREAT SERPL-MCNC: 0.9 MG/DL (ref 0.5–1.4)
EST. GFR  (AFRICAN AMERICAN): >60 ML/MIN/1.73 M^2
EST. GFR  (NON AFRICAN AMERICAN): >60 ML/MIN/1.73 M^2
GLUCOSE SERPL-MCNC: 87 MG/DL (ref 70–110)
POTASSIUM SERPL-SCNC: 4 MMOL/L (ref 3.5–5.1)
SODIUM SERPL-SCNC: 139 MMOL/L (ref 136–145)

## 2019-11-08 PROCEDURE — 80048 BASIC METABOLIC PNL TOTAL CA: CPT

## 2019-11-08 PROCEDURE — 96372 THER/PROPH/DIAG INJ SC/IM: CPT

## 2019-11-08 PROCEDURE — G0378 HOSPITAL OBSERVATION PER HR: HCPCS

## 2019-11-08 PROCEDURE — 25000003 PHARM REV CODE 250: Performed by: UROLOGY

## 2019-11-08 PROCEDURE — 36415 COLL VENOUS BLD VENIPUNCTURE: CPT

## 2019-11-08 PROCEDURE — 63600175 PHARM REV CODE 636 W HCPCS: Performed by: UROLOGY

## 2019-11-08 PROCEDURE — 90471 IMMUNIZATION ADMIN: CPT | Performed by: UROLOGY

## 2019-11-08 PROCEDURE — A4216 STERILE WATER/SALINE, 10 ML: HCPCS | Performed by: ANESTHESIOLOGY

## 2019-11-08 PROCEDURE — 90686 IIV4 VACC NO PRSV 0.5 ML IM: CPT | Performed by: UROLOGY

## 2019-11-08 PROCEDURE — 25000003 PHARM REV CODE 250: Performed by: ANESTHESIOLOGY

## 2019-11-08 RX ORDER — CIPROFLOXACIN 500 MG/1
500 TABLET ORAL 2 TIMES DAILY
Qty: 14 TABLET | Refills: 0 | Status: SHIPPED | OUTPATIENT
Start: 2019-11-08 | End: 2019-11-15

## 2019-11-08 RX ORDER — POTASSIUM CITRATE 15 MEQ/1
10 TABLET, EXTENDED RELEASE ORAL DAILY
Qty: 30 TABLET | Refills: 0 | Status: SHIPPED | OUTPATIENT
Start: 2019-11-09 | End: 2020-02-19

## 2019-11-08 RX ORDER — HYDROCHLOROTHIAZIDE 12.5 MG/1
12.5 TABLET ORAL EVERY OTHER DAY
Qty: 15 TABLET | Refills: 1 | Status: SHIPPED | OUTPATIENT
Start: 2019-11-10 | End: 2020-02-19

## 2019-11-08 RX ORDER — TAMSULOSIN HYDROCHLORIDE 0.4 MG/1
0.4 CAPSULE ORAL DAILY
Qty: 30 CAPSULE | Refills: 0 | Status: SHIPPED | OUTPATIENT
Start: 2019-11-09 | End: 2020-02-19

## 2019-11-08 RX ADMIN — Medication 3 ML: at 05:11

## 2019-11-08 RX ADMIN — SODIUM CHLORIDE: 0.9 INJECTION, SOLUTION INTRAVENOUS at 01:11

## 2019-11-08 RX ADMIN — HYDROCHLOROTHIAZIDE 12.5 MG: 12.5 TABLET ORAL at 09:11

## 2019-11-08 RX ADMIN — POTASSIUM CITRATE 10 MEQ: 10 TABLET ORAL at 09:11

## 2019-11-08 RX ADMIN — ACETAMINOPHEN 1000 MG: 500 TABLET, FILM COATED ORAL at 05:11

## 2019-11-08 RX ADMIN — TAMSULOSIN HYDROCHLORIDE 0.4 MG: 0.4 CAPSULE ORAL at 09:11

## 2019-11-08 RX ADMIN — INFLUENZA VIRUS VACCINE 0.5 ML: 15; 15; 15; 15 SUSPENSION INTRAMUSCULAR at 12:11

## 2019-11-08 NOTE — NURSING
Patient returned from PACU, VSS, pain controlled, assessment unchanged, call bell in reach bed low and locked, sr up x2, mother at bedside, pt voices no concerns or c/o, instructed to call for assistance, verbalizes understanding of all instructions

## 2019-11-08 NOTE — TELEPHONE ENCOUNTER
----- Message from Radha Clark RN sent at 11/8/2019 12:08 PM CST -----  Patient needs to see Dr Rosales in 2 weeks for stent removal. Can you please set up this appointment as I am unable to do so.    Thank you,  Radha KLEIN   Med-surg

## 2019-11-08 NOTE — DISCHARGE SUMMARY
[facility] Hospital Medicine  Discharge Summary      Patient Name: Neha Vázquez  MRN: [id number]  Admission Date: 11/5/2019  Hospital Length of Stay: 0 days  Discharge Date and Time:  11/08/2019 2:22 PM  Attending Physician: Segun Brewer MD  Discharging Provider: Stephanie Howard NP  Primary Care Provider: Provider Notinsystem      HPI:   Neha Vázquez is a 23 y.o. female patient with a PMHx of Nephrolithiasis, HCV and IV drug abuse (she is in sober living house) who presents to the Emergency Department for evaluation of R sided flank pain which onset gradually several days ago with associated nausea.  She was seen at [facility] 2 nights ago for same symptoms and dx with kidney stone.  While at [facility] ED she received IV Toradol and a prescription for Toradol. She presents due to continued pain. She denies fever, chills, vomiting, SOB, hematuria and other symptoms. Vital signs on arrival: normal. Labs find a mild leukocytosis, CO 2 = 20, creatinine 2.4 (11/3 - 1.01) with GFR 28. Renal stone study was significant for mild right hydroureteronephrosis secondary to a 5 x 5 mm right distal ureteral calculus.  Right renal edema and perinephric inflammatory stranding.  Interval development of diffuse left renal cortical scarring.  No evidence of left hydronephrosis.    Procedure(s) (LRB):  CYSTOSCOPY, WITH URETERAL STENT INSERTION (Right)  PYELOGRAM, RETROGRADE (N/A)      Hospital Course:   On 11/5 patient was placed in Observation secondary to JAZMINE thought to be r/t NSAID and Toradol use.  Initial Cr elevated to 2.4 (1.01 on 11/3 at [facility]).  Renal CT showed 5 x 5 mm right distal ureteral calculus resulting in mild right hydronephrosis.  Per d/w Urology, no intervention required at this time and patient was started on Flomax.  Patient was also started on IVFs and Nephrology consulted to assist with care.      As of 11/6 patient reports improvement in right side flank pain and reports good UOP.   Still has not passed calculi.  She is drinking fluids appropriately and IVFs continue.  Repeat Cr today improved to 2.0. Case d/w Dr. Piper.  Will repeat KUB today to re-evaluate right sided calculus and increase IVFs to 150cc/hour.  Per nephrology recs, will also start on low dose HCTZ qod and K citrate BID.  Plan to continue IVFs overnight and re-assess Cr in the AM.      11/7 - Pt spiked a temp of 101.4 this AM. IV Rocephin started. Urine culture was negative. Pt to go to OR for cysto and right ureteral stent by Urology.Pt states she is still having right sided flank pain. Denies any difficulty urinating. Denies nausea/vomiting.   11/8 S/P cysto with right ureteral stent. Repeat urine culture pending. Serum creatinine has normalized and pt afebrile for last 24 hours. The right sided flank pain has resolved. She verbalized follow up with Urology in 2 weeks. She was seen and examined and determined to be safe and stable for discharge. She was prescribed Cipro bid x 7 days.      Consults:   Consults (From admission, onward)        Status Ordering Provider     Inpatient consult to Nephrology  Once     Provider:  (Not yet assigned)    REA Wick IV     Inpatient consult to Urology  Once     Provider:  Rea Rosales IV, MD    Completed LACY PIPER          No new Assessment & Plan notes have been filed under this hospital service since the last note was generated.  Service: Hospital Medicine    Final Active Diagnoses:    Diagnosis Date Noted POA    PRINCIPAL PROBLEM:  Ureterolithiasis [N20.1] 11/05/2019 Yes    Hydronephrosis [N13.30]  Yes      Problems Resolved During this Admission:    Diagnosis Date Noted Date Resolved POA    JAZMINE (acute kidney injury) [N17.9] 11/05/2019 11/08/2019 Yes    Leukocytosis [D72.829] 11/05/2019 11/08/2019 Yes       Discharged Condition: good    Disposition: Home or Self Care    Follow Up:  Follow-up Information     Rea Rosales IV, MD In 2 weeks.     Specialty:  Urology  Why:  Hospital Follow Up - Removal of ureteral stent and kidney stones  Contact information:  [address] [place] [address] DR Renee MARINA [id number] [phone]             Primary Care Doctor.    Why:  Hospital Follow Up - Kidney stone and complicated UTI               Patient Instructions:      Notify your health care provider if you experience any of the following:  temperature >100.4     Notify your health care provider if you experience any of the following:  persistent nausea and vomiting or diarrhea     Notify your health care provider if you experience any of the following:  severe uncontrolled pain     Notify your health care provider if you experience any of the following:  temperature >100.4     Notify your health care provider if you experience any of the following:  persistent nausea and vomiting or diarrhea     Notify your health care provider if you experience any of the following:  severe uncontrolled pain     Activity as tolerated     Activity as tolerated       Significant Diagnostic Studies: Labs:   CMP   Recent Labs   Lab 11/07/19  0439 11/08/19  0629    139   K 4.5 4.0    107   CO2 21* 20*   GLU 99 87   BUN 19 12   CREATININE 2.0* 0.9   CALCIUM 9.1 8.9   ANIONGAP 10 12   ESTGFRAFRICA 40* >60   EGFRNONAA 34* >60    and CBC   Recent Labs   Lab 11/07/19  0439   WBC 12.95*   HGB 10.7*   HCT 34.7*          Pending Diagnostic Studies:     None         Medications:  Reconciled Home Medications:      Medication List      START taking these medications    ciprofloxacin HCl 500 MG tablet  Commonly known as:  CIPRO  Take 1 tablet (500 mg total) by mouth 2 (two) times daily. for 7 days     hydroCHLOROthiazide 12.5 MG Tab  Commonly known as:  HYDRODIURIL  Take 1 tablet (12.5 mg total) by mouth every other day.  Start taking on:  November 10, 2019     potassium citrate 15 mEq Tbsr  Commonly known as:  UROCIT-K 15  Take 10 mEq by mouth once daily.  Start taking on:  November  9, 2019     tamsulosin 0.4 mg Cap  Commonly known as:  FLOMAX  Take 1 capsule (0.4 mg total) by mouth once daily.  Start taking on:  November 9, 2019        CONTINUE taking these medications    naltrexone 50 mg tablet  Commonly known as:  DEPADE  Take 50 mg by mouth once daily.        STOP taking these medications    ketorolac 10 mg tablet  Commonly known as:  TORADOL            Indwelling Lines/Drains at time of discharge:   Lines/Drains/Airways     None                 Time spent on the discharge of patient: > 30 minutes  Patient was seen and examined on the date of discharge and determined to be suitable for discharge.         Stephanie Howard NP  Department of Hospital Medicine  [facility]

## 2019-11-08 NOTE — PLAN OF CARE
Iv fluid infusing as ordered by MD. Pain in control.  Bed at lowest position with breaks locked. All the safety measures in place. Call light within reach.

## 2019-11-08 NOTE — TELEPHONE ENCOUNTER
Spoke to Nidia, mom, and informed her that Neha needs to be seen to have her stent removed in 2 weeks. I have scheduled her for Wednesday 11/20/19 at 1:30 pm. She verbally understood.

## 2019-11-09 LAB — BACTERIA UR CULT: ABNORMAL

## 2019-11-10 LAB
BACTERIA BLD CULT: NORMAL
BACTERIA BLD CULT: NORMAL

## 2019-11-11 ENCOUNTER — TELEPHONE (OUTPATIENT)
Dept: UROLOGY | Facility: CLINIC | Age: 24
End: 2019-11-11

## 2019-11-11 RX ORDER — FLUCONAZOLE 200 MG/1
200 TABLET ORAL DAILY
Qty: 3 TABLET | Refills: 0 | Status: SHIPPED | OUTPATIENT
Start: 2019-11-11 | End: 2019-11-14

## 2019-11-11 NOTE — TELEPHONE ENCOUNTER
Attempted to contact pt to inform her that MD sent in Rx for Diflucan for yeast infection.  No answer.  Msg left on voice mail.

## 2019-11-12 NOTE — ANESTHESIA POSTPROCEDURE EVALUATION
Anesthesia Post Evaluation    Patient: Neha Vázquez    Procedure(s) Performed: Procedure(s) (LRB):  CYSTOSCOPY, WITH URETERAL STENT INSERTION (Right)  PYELOGRAM, RETROGRADE (N/A)    Final Anesthesia Type: general  Patient location during evaluation: PACU  Patient participation: Yes- Able to Participate  Level of consciousness: awake and alert, oriented and awake  Post-procedure vital signs: reviewed and stable  Pain management: adequate  Airway patency: patent  PONV status at discharge: No PONV  Anesthetic complications: no      Cardiovascular status: blood pressure returned to baseline  Respiratory status: unassisted and spontaneous ventilation  Hydration status: euvolemic  Follow-up not needed.          Vitals Value Taken Time   BP  11/12/2019  3:40 PM   Temp  11/12/2019  3:40 PM   Pulse  11/12/2019  3:40 PM   Resp  11/12/2019  3:40 PM   SpO2  11/12/2019  3:40 PM         Event Time     Out of Recovery 17:50:30          Pain/Wiliam Score: No data recorded

## 2019-11-20 ENCOUNTER — OFFICE VISIT (OUTPATIENT)
Dept: UROLOGY | Facility: CLINIC | Age: 24
End: 2019-11-20
Payer: MEDICAID

## 2019-11-20 VITALS
DIASTOLIC BLOOD PRESSURE: 60 MMHG | HEART RATE: 95 BPM | HEIGHT: 65 IN | WEIGHT: 231.94 LBS | BODY MASS INDEX: 38.64 KG/M2 | SYSTOLIC BLOOD PRESSURE: 122 MMHG

## 2019-11-20 DIAGNOSIS — N20.1 URETERAL STONE: Primary | ICD-10-CM

## 2019-11-20 LAB
BILIRUB SERPL-MCNC: ABNORMAL MG/DL
BLOOD URINE, POC: 250
COLOR, POC UA: YELLOW
GLUCOSE UR QL STRIP: ABNORMAL
KETONES UR QL STRIP: ABNORMAL
LEUKOCYTE ESTERASE URINE, POC: ABNORMAL
NITRITE, POC UA: ABNORMAL
PH, POC UA: 5
PROTEIN, POC: ABNORMAL
SPECIFIC GRAVITY, POC UA: 1.02
UROBILINOGEN, POC UA: ABNORMAL

## 2019-11-20 PROCEDURE — 99999 PR PBB SHADOW E&M-EST. PATIENT-LVL III: ICD-10-PCS | Mod: PBBFAC,,, | Performed by: UROLOGY

## 2019-11-20 PROCEDURE — 99024 POSTOP FOLLOW-UP VISIT: CPT | Mod: ,,, | Performed by: UROLOGY

## 2019-11-20 PROCEDURE — 99024 PR POST-OP FOLLOW-UP VISIT: ICD-10-PCS | Mod: ,,, | Performed by: UROLOGY

## 2019-11-20 PROCEDURE — 81002 URINALYSIS NONAUTO W/O SCOPE: CPT | Mod: PBBFAC | Performed by: UROLOGY

## 2019-11-20 PROCEDURE — 99999 PR PBB SHADOW E&M-EST. PATIENT-LVL III: CPT | Mod: PBBFAC,,, | Performed by: UROLOGY

## 2019-11-20 PROCEDURE — 99213 OFFICE O/P EST LOW 20 MIN: CPT | Mod: PBBFAC | Performed by: UROLOGY

## 2019-11-20 NOTE — PROGRESS NOTES
Chief Complaint: Right ureteral stone    HPI:   11/20/19: Right ureteral stent placed two weeks ago, ready for staged URS.  11/7/19: 24 yo woman presented to ER 2d ago with right ureteral stone and low suspicion for infection but after observation she has continued low grade fever and likely has a pyelonephritis present on admission that has not fully developed into a worse problem yet.  Has not had prior stones.  Was in a rehab facility for the last month and at the end of the 28 day program was advised to come to the ER for right flank pain.  This pain has been well controlled.    Allergies:  Patient has no known allergies.    Medications: has a current medication list which includes the following prescription(s): hydrochlorothiazide, naltrexone, potassium citrate, and tamsulosin.    Review of Systems:  General: No fever, chills, fatigability, or weight loss.  Skin: No rashes, itching, or changes in color or texture of skin.  Chest: Denies ELAM, cyanosis, wheezing, cough, and sputum production.  Abdomen: Appetite fine. No weight loss. Denies diarrhea, abdominal pain, hematemesis, or blood in stool.  Musculoskeletal: No joint stiffness or swelling. Denies back pain.  : As above.  All other review of systems negative.    PMH:   has a past medical history of HCV (hepatitis C virus) and IV drug abuse.    PSH:   has a past surgical history that includes Cystoscopy w/ ureteral stent placement (Right, 11/7/2019) and Retrograde pyelography (N/A, 11/7/2019).    FamHx: family history includes Heart disease in her maternal grandfather.    SocHx:  reports that she has never smoked. She has never used smokeless tobacco. She reports that she has current or past drug history. She reports that she does not drink alcohol.     Physical Exam:  Vitals:   Vitals:    11/20/19 1333   BP: 122/60   Pulse: 95     General: A&Ox3. No apparent distress. No deformities.  Neck: No masses. Normal thyroid.  Lungs: normal inspiration. No use of  accessory muscles.  Heart: normal pulse. No arrhythmias.  Abdomen: Soft. NT. ND  Skin: The skin is warm and dry. No jaundice.  Ext: No c/c/e.  : deferred    Labs/Studies:     Impression/Plan:   1. To OR for staged right URS, risks/benefits reviewed consents on chart.

## 2019-11-20 NOTE — H&P (VIEW-ONLY)
Chief Complaint: Right ureteral stone    HPI:   11/20/19: Right ureteral stent placed two weeks ago, ready for staged URS.  11/7/19: 22 yo woman presented to ER 2d ago with right ureteral stone and low suspicion for infection but after observation she has continued low grade fever and likely has a pyelonephritis present on admission that has not fully developed into a worse problem yet.  Has not had prior stones.  Was in a rehab facility for the last month and at the end of the 28 day program was advised to come to the ER for right flank pain.  This pain has been well controlled.    Allergies:  Patient has no known allergies.    Medications: has a current medication list which includes the following prescription(s): hydrochlorothiazide, naltrexone, potassium citrate, and tamsulosin.    Review of Systems:  General: No fever, chills, fatigability, or weight loss.  Skin: No rashes, itching, or changes in color or texture of skin.  Chest: Denies ELAM, cyanosis, wheezing, cough, and sputum production.  Abdomen: Appetite fine. No weight loss. Denies diarrhea, abdominal pain, hematemesis, or blood in stool.  Musculoskeletal: No joint stiffness or swelling. Denies back pain.  : As above.  All other review of systems negative.    PMH:   has a past medical history of HCV (hepatitis C virus) and IV drug abuse.    PSH:   has a past surgical history that includes Cystoscopy w/ ureteral stent placement (Right, 11/7/2019) and Retrograde pyelography (N/A, 11/7/2019).    FamHx: family history includes Heart disease in her maternal grandfather.    SocHx:  reports that she has never smoked. She has never used smokeless tobacco. She reports that she has current or past drug history. She reports that she does not drink alcohol.     Physical Exam:  Vitals:   Vitals:    11/20/19 1333   BP: 122/60   Pulse: 95     General: A&Ox3. No apparent distress. No deformities.  Neck: No masses. Normal thyroid.  Lungs: normal inspiration. No use of  accessory muscles.  Heart: normal pulse. No arrhythmias.  Abdomen: Soft. NT. ND  Skin: The skin is warm and dry. No jaundice.  Ext: No c/c/e.  : deferred    Labs/Studies:     Impression/Plan:   1. To OR for staged right URS, risks/benefits reviewed consents on chart.

## 2019-12-03 ENCOUNTER — ANESTHESIA EVENT (OUTPATIENT)
Dept: SURGERY | Facility: HOSPITAL | Age: 24
End: 2019-12-03
Payer: MEDICAID

## 2019-12-04 ENCOUNTER — TELEPHONE (OUTPATIENT)
Dept: UROLOGY | Facility: CLINIC | Age: 24
End: 2019-12-04

## 2019-12-04 NOTE — PRE ADMISSION SCREENING
Pre op instructions reviewed with patient per phone:    To confirm, Your surgeon has instructed you:  Surgery is scheduled 12/05/19 at per MD .      Please report to [facility] 1st floor main lobby by per MD.         INSTRUCTIONS IMPORTANT!!!  ¨ Do not eat, drink, or smoke after 12 midnight-including water. OK to brush teeth, no gum, candy or mints!    ¨ Take only these medicines with a small swallow of water-morning of surgery.  N/A        ____  Do not wear makeup, including mascara.  ____  No powder, lotions or creams to surgical area.  ____  Please remove all jewelry, including piercings and leave at home.  ____  No money or valuables needed. Please leave at home.  ____  Please bring identification and insurance information to hospital.  ____  If going home the same day, arrange for a ride home. You will not be able to   drive if Anesthesia was used.  ____  Children, under 12 years old, must remain in the waiting room with an adult.  They are not allowed in patient areas.  ____  Wear loose fitting clothing. Allow for dressings, bandages.  ____  Stop Aspirin, Ibuprofen, Motrin and Aleve at least 5-7 days before surgery, unless otherwise instructed by your doctor, or the nurse.   You MAY use Tylenol/acetaminophen until day of surgery.  ____  If you take diabetic medication, do not take am of surgery unless instructed by   Doctor.  ____ Stop taking any Fish Oil supplement or any Vitamins that contain Vitamin E at least 5 days prior to surgery.          Bathing Instructions-- The night before surgery and the morning prior to coming to the hospital:   -Do not shave the surgical area.   -Shower and wash your hair and body as usual with anti-bacterial  soap and shampoo.   -Rinse your hair and body completely.   -Use one packet of hibiclens to wash the surgical site (using your hand) gently for 5 minutes.  Do not scrub you skin too hard.   -Do not use hibiclens on your head, face, or  genitals.   -Do not wash with anti-bacterial soap after you use the hibiclens.   -Rinse your body thoroughly.   -Dry with clean, soft towel.  Do not use lotion, cream, deodorant, or powders on   the surgical site.    Use antibacterial soap in place of hibiclens if your surgery is on the head, face or genitals.         Surgical Site Infection    Prevention of surgical site infections:     -Keep incisions clean and dry.   -Do not soak/submerge incisions in water until completely healed.   -Do not apply lotions, powders, creams, or deodorants to site.   -Always make sure hands are cleaned with antibacterial soap/ alcohol-based   prior to touching the surgical site.  (This includes doctors, nurses, staff, and yourself.)    Signs and symptoms:   -Redness and pain around the area where you had surgery   -Drainage of cloudy fluid from your surgical wound   -Fever over 100.4  I have read or had read and explained to me, and understand the above information.

## 2019-12-04 NOTE — TELEPHONE ENCOUNTER
Contacted pt and informed her that her surgery arrival time for tomorrow's procedure was at 5:30am.

## 2019-12-05 ENCOUNTER — ANESTHESIA (OUTPATIENT)
Dept: SURGERY | Facility: HOSPITAL | Age: 24
End: 2019-12-05
Payer: MEDICAID

## 2019-12-05 ENCOUNTER — HOSPITAL ENCOUNTER (OUTPATIENT)
Facility: HOSPITAL | Age: 24
Discharge: HOME | End: 2019-12-05
Attending: UROLOGY | Admitting: UROLOGY
Payer: MEDICAID

## 2019-12-05 ENCOUNTER — TELEPHONE (OUTPATIENT)
Dept: UROLOGY | Facility: CLINIC | Age: 24
End: 2019-12-05

## 2019-12-05 VITALS
BODY MASS INDEX: 38.49 KG/M2 | TEMPERATURE: 98 F | HEIGHT: 65 IN | RESPIRATION RATE: 18 BRPM | WEIGHT: 231 LBS | OXYGEN SATURATION: 95 % | HEART RATE: 97 BPM | DIASTOLIC BLOOD PRESSURE: 74 MMHG | SYSTOLIC BLOOD PRESSURE: 123 MMHG

## 2019-12-05 DIAGNOSIS — N20.1 URETEROLITHIASIS: Primary | ICD-10-CM

## 2019-12-05 DIAGNOSIS — N20.1 URETERAL STONE: ICD-10-CM

## 2019-12-05 LAB
B-HCG UR QL: NEGATIVE
CTP QC/QA: YES

## 2019-12-05 PROCEDURE — 27201423 OPTIME MED/SURG SUP & DEVICES STERILE SUPPLY: Performed by: UROLOGY

## 2019-12-05 PROCEDURE — C1773 RET DEV, INSERTABLE: HCPCS | Performed by: UROLOGY

## 2019-12-05 PROCEDURE — 88300 SURGICAL PATH GROSS: CPT | Performed by: PATHOLOGY

## 2019-12-05 PROCEDURE — 00918 ANES TRURL PX URTRL CAL RMVL: CPT | Performed by: UROLOGY

## 2019-12-05 PROCEDURE — 82365 CALCULUS SPECTROSCOPY: CPT

## 2019-12-05 PROCEDURE — 88300 SURGICAL PATH GROSS: CPT | Mod: 26,,, | Performed by: PATHOLOGY

## 2019-12-05 PROCEDURE — C2617 STENT, NON-COR, TEM W/O DEL: HCPCS | Performed by: UROLOGY

## 2019-12-05 PROCEDURE — 36000707: Performed by: UROLOGY

## 2019-12-05 PROCEDURE — 71000033 HC RECOVERY, INTIAL HOUR: Performed by: UROLOGY

## 2019-12-05 PROCEDURE — 88300 PR  SURG PATH,GROSS,LEVEL I: ICD-10-PCS | Mod: 26,,, | Performed by: PATHOLOGY

## 2019-12-05 PROCEDURE — 52356 PR CYSTO/URETERO W/LITHOTRIPSY: ICD-10-PCS | Mod: RT,,, | Performed by: UROLOGY

## 2019-12-05 PROCEDURE — 63600175 PHARM REV CODE 636 W HCPCS: Performed by: UROLOGY

## 2019-12-05 PROCEDURE — 37000008 HC ANESTHESIA 1ST 15 MINUTES: Performed by: UROLOGY

## 2019-12-05 PROCEDURE — 81025 URINE PREGNANCY TEST: CPT | Performed by: UROLOGY

## 2019-12-05 PROCEDURE — 71000015 HC POSTOP RECOV 1ST HR: Performed by: UROLOGY

## 2019-12-05 PROCEDURE — 36000706: Performed by: UROLOGY

## 2019-12-05 PROCEDURE — 37000009 HC ANESTHESIA EA ADD 15 MINS: Performed by: UROLOGY

## 2019-12-05 PROCEDURE — 77001 FLUOROGUIDE FOR VEIN DEVICE: CPT

## 2019-12-05 PROCEDURE — 52356 CYSTO/URETERO W/LITHOTRIPSY: CPT | Mod: RT,,, | Performed by: UROLOGY

## 2019-12-05 PROCEDURE — C1769 GUIDE WIRE: HCPCS | Performed by: UROLOGY

## 2019-12-05 PROCEDURE — 63600175 PHARM REV CODE 636 W HCPCS: Performed by: NURSE ANESTHETIST, CERTIFIED REGISTERED

## 2019-12-05 PROCEDURE — 25000003 PHARM REV CODE 250: Performed by: NURSE ANESTHETIST, CERTIFIED REGISTERED

## 2019-12-05 PROCEDURE — 63600175 PHARM REV CODE 636 W HCPCS: Performed by: ANESTHESIOLOGY

## 2019-12-05 DEVICE — STENT URETERAL UNIV 6FR 26CM: Type: IMPLANTABLE DEVICE | Site: URETER | Status: FUNCTIONAL

## 2019-12-05 RX ORDER — FUROSEMIDE 10 MG/ML
INJECTION INTRAMUSCULAR; INTRAVENOUS
Status: DISCONTINUED | OUTPATIENT
Start: 2019-12-05 | End: 2019-12-05

## 2019-12-05 RX ORDER — PROPOFOL 10 MG/ML
VIAL (ML) INTRAVENOUS
Status: DISCONTINUED | OUTPATIENT
Start: 2019-12-05 | End: 2019-12-05

## 2019-12-05 RX ORDER — MIDAZOLAM HYDROCHLORIDE 1 MG/ML
INJECTION, SOLUTION INTRAMUSCULAR; INTRAVENOUS
Status: DISCONTINUED | OUTPATIENT
Start: 2019-12-05 | End: 2019-12-05

## 2019-12-05 RX ORDER — NEOSTIGMINE METHYLSULFATE 1 MG/ML
INJECTION, SOLUTION INTRAVENOUS
Status: DISCONTINUED | OUTPATIENT
Start: 2019-12-05 | End: 2019-12-05

## 2019-12-05 RX ORDER — DIPHENHYDRAMINE HYDROCHLORIDE 50 MG/ML
25 INJECTION INTRAMUSCULAR; INTRAVENOUS EVERY 6 HOURS PRN
Status: DISCONTINUED | OUTPATIENT
Start: 2019-12-05 | End: 2019-12-05 | Stop reason: HOSPADM

## 2019-12-05 RX ORDER — CIPROFLOXACIN 500 MG/1
500 TABLET ORAL EVERY 12 HOURS
Qty: 6 TABLET | Refills: 0 | Status: SHIPPED | OUTPATIENT
Start: 2019-12-05 | End: 2019-12-08

## 2019-12-05 RX ORDER — HYDROCODONE BITARTRATE AND ACETAMINOPHEN 10; 325 MG/1; MG/1
1 TABLET ORAL EVERY 4 HOURS PRN
Status: DISCONTINUED | OUTPATIENT
Start: 2019-12-05 | End: 2019-12-05 | Stop reason: HOSPADM

## 2019-12-05 RX ORDER — FENTANYL CITRATE 50 UG/ML
INJECTION, SOLUTION INTRAMUSCULAR; INTRAVENOUS
Status: DISCONTINUED | OUTPATIENT
Start: 2019-12-05 | End: 2019-12-05

## 2019-12-05 RX ORDER — ROCURONIUM BROMIDE 10 MG/ML
INJECTION, SOLUTION INTRAVENOUS
Status: DISCONTINUED | OUTPATIENT
Start: 2019-12-05 | End: 2019-12-05

## 2019-12-05 RX ORDER — HYDROCODONE BITARTRATE AND ACETAMINOPHEN 7.5; 325 MG/1; MG/1
1 TABLET ORAL EVERY 6 HOURS PRN
Qty: 10 TABLET | Refills: 0 | Status: SHIPPED | OUTPATIENT
Start: 2019-12-05 | End: 2019-12-15

## 2019-12-05 RX ORDER — HYDROMORPHONE HYDROCHLORIDE 2 MG/ML
0.2 INJECTION, SOLUTION INTRAMUSCULAR; INTRAVENOUS; SUBCUTANEOUS EVERY 5 MIN PRN
Status: DISCONTINUED | OUTPATIENT
Start: 2019-12-05 | End: 2019-12-05 | Stop reason: HOSPADM

## 2019-12-05 RX ORDER — METOCLOPRAMIDE HYDROCHLORIDE 5 MG/ML
10 INJECTION INTRAMUSCULAR; INTRAVENOUS EVERY 10 MIN PRN
Status: DISCONTINUED | OUTPATIENT
Start: 2019-12-05 | End: 2019-12-05 | Stop reason: HOSPADM

## 2019-12-05 RX ORDER — ONDANSETRON 2 MG/ML
INJECTION INTRAMUSCULAR; INTRAVENOUS
Status: DISCONTINUED | OUTPATIENT
Start: 2019-12-05 | End: 2019-12-05

## 2019-12-05 RX ORDER — LIDOCAINE HYDROCHLORIDE 10 MG/ML
INJECTION, SOLUTION EPIDURAL; INFILTRATION; INTRACAUDAL; PERINEURAL
Status: DISCONTINUED | OUTPATIENT
Start: 2019-12-05 | End: 2019-12-05

## 2019-12-05 RX ORDER — MEPERIDINE HYDROCHLORIDE 50 MG/ML
12.5 INJECTION INTRAMUSCULAR; INTRAVENOUS; SUBCUTANEOUS ONCE AS NEEDED
Status: DISCONTINUED | OUTPATIENT
Start: 2019-12-05 | End: 2019-12-05 | Stop reason: HOSPADM

## 2019-12-05 RX ORDER — HYDROCODONE BITARTRATE AND ACETAMINOPHEN 5; 325 MG/1; MG/1
1 TABLET ORAL EVERY 4 HOURS PRN
Status: DISCONTINUED | OUTPATIENT
Start: 2019-12-05 | End: 2019-12-05 | Stop reason: HOSPADM

## 2019-12-05 RX ORDER — SUCCINYLCHOLINE CHLORIDE 20 MG/ML
INJECTION INTRAMUSCULAR; INTRAVENOUS
Status: DISCONTINUED | OUTPATIENT
Start: 2019-12-05 | End: 2019-12-05

## 2019-12-05 RX ORDER — SODIUM CHLORIDE 0.9 % (FLUSH) 0.9 %
3 SYRINGE (ML) INJECTION EVERY 8 HOURS
Status: DISCONTINUED | OUTPATIENT
Start: 2019-12-05 | End: 2019-12-05 | Stop reason: HOSPADM

## 2019-12-05 RX ORDER — CEFAZOLIN SODIUM 2 G/50ML
2 SOLUTION INTRAVENOUS
Status: DISCONTINUED | OUTPATIENT
Start: 2019-12-05 | End: 2019-12-05 | Stop reason: HOSPADM

## 2019-12-05 RX ORDER — GLYCOPYRROLATE 0.2 MG/ML
INJECTION INTRAMUSCULAR; INTRAVENOUS
Status: DISCONTINUED | OUTPATIENT
Start: 2019-12-05 | End: 2019-12-05

## 2019-12-05 RX ORDER — SODIUM CHLORIDE, SODIUM LACTATE, POTASSIUM CHLORIDE, CALCIUM CHLORIDE 600; 310; 30; 20 MG/100ML; MG/100ML; MG/100ML; MG/100ML
INJECTION, SOLUTION INTRAVENOUS CONTINUOUS PRN
Status: DISCONTINUED | OUTPATIENT
Start: 2019-12-05 | End: 2019-12-05

## 2019-12-05 RX ADMIN — SODIUM CHLORIDE, SODIUM LACTATE, POTASSIUM CHLORIDE, AND CALCIUM CHLORIDE: 600; 310; 30; 20 INJECTION, SOLUTION INTRAVENOUS at 07:12

## 2019-12-05 RX ADMIN — LIDOCAINE HYDROCHLORIDE 50 MG: 10 INJECTION, SOLUTION EPIDURAL; INFILTRATION; INTRACAUDAL; PERINEURAL at 07:12

## 2019-12-05 RX ADMIN — ROBINUL 0.8 MG: 0.2 INJECTION INTRAMUSCULAR; INTRAVENOUS at 07:12

## 2019-12-05 RX ADMIN — HYDROMORPHONE HYDROCHLORIDE 0.2 MG: 2 INJECTION, SOLUTION INTRAMUSCULAR; INTRAVENOUS; SUBCUTANEOUS at 08:12

## 2019-12-05 RX ADMIN — FUROSEMIDE 10 MG: 10 INJECTION, SOLUTION INTRAMUSCULAR; INTRAVENOUS at 07:12

## 2019-12-05 RX ADMIN — ONDANSETRON 4 MG: 2 INJECTION, SOLUTION INTRAMUSCULAR; INTRAVENOUS at 07:12

## 2019-12-05 RX ADMIN — MIDAZOLAM 2 MG: 1 INJECTION INTRAMUSCULAR; INTRAVENOUS at 07:12

## 2019-12-05 RX ADMIN — NEOSTIGMINE METHYLSULFATE 5 MG: 1 INJECTION INTRAVENOUS at 07:12

## 2019-12-05 RX ADMIN — ROCURONIUM BROMIDE 10 MG: 10 INJECTION, SOLUTION INTRAVENOUS at 07:12

## 2019-12-05 RX ADMIN — CEFAZOLIN SODIUM 2 G: 2 SOLUTION INTRAVENOUS at 07:12

## 2019-12-05 RX ADMIN — ROCURONIUM BROMIDE 20 MG: 10 INJECTION, SOLUTION INTRAVENOUS at 07:12

## 2019-12-05 RX ADMIN — PROPOFOL 150 MG: 10 INJECTION, EMULSION INTRAVENOUS at 07:12

## 2019-12-05 RX ADMIN — SUCCINYLCHOLINE CHLORIDE 120 MG: 20 INJECTION, SOLUTION INTRAMUSCULAR; INTRAVENOUS at 07:12

## 2019-12-05 RX ADMIN — FENTANYL CITRATE 100 MCG: 50 INJECTION, SOLUTION INTRAMUSCULAR; INTRAVENOUS at 07:12

## 2019-12-05 NOTE — TRANSFER OF CARE
"Anesthesia Transfer of Care Note    Patient: Neha Vázquez    Procedure(s) Performed: Procedure(s) (LRB):  LITHOTRIPSY, USING LASER (Right)  CYSTOSCOPY, WITH URETERAL STENT REMOVAL (Right)  EXTRACTION - STONE (Right)  CYSTOSCOPY, WITH URETERAL STENT INSERTION (Right)    Patient location: PACU    Anesthesia Type: general    Transport from OR: Transported from OR on room air with adequate spontaneous ventilation    Post pain: adequate analgesia    Post assessment: no apparent anesthetic complications and tolerated procedure well    Post vital signs: stable    Level of consciousness: awake, alert and oriented    Nausea/Vomiting: no nausea/vomiting    Complications: none    Transfer of care protocol was followed      Last vitals:   Visit Vitals  /63 (BP Location: Right arm, Patient Position: Sitting)   Pulse 84   Temp 37.1 °C (98.8 °F) (Temporal)   Resp 20   Ht 5' 5" (1.651 m)   Wt 104.8 kg (231 lb)   LMP 11/04/2019 (Approximate)   SpO2 97%   Breastfeeding? No   BMI 38.44 kg/m²     "

## 2019-12-05 NOTE — ANESTHESIA PREPROCEDURE EVALUATION
12/05/2019  Neha Vázquez is a 24 y.o., female.    Pre-op Assessment    I have reviewed the Patient Summary Reports.     I have reviewed the Medications.     Review of Systems  Anesthesia Hx:  No problems with previous Anesthesia   Denies Personal Hx of Anesthesia complications.   Social:  Smoker    Hematology/Oncology:  Hematology Normal   Oncology Normal     EENT/Dental:EENT/Dental Normal   Cardiovascular:  Cardiovascular Normal Exercise tolerance: good     Pulmonary:  Pulmonary Normal    Renal/:   Chronic Renal Disease, CRI renal calculi    Hepatic/GI:   Hepatitis, C    Musculoskeletal:  Musculoskeletal Normal    Neurological:  Neurology Normal    Endocrine:  Endocrine Normal    Dermatological:  Skin Normal    Psych:  Psychiatric Normal           Physical Exam  General:  Well nourished, Obesity    Airway/Jaw/Neck:  Airway Findings: Mouth Opening: Normal Tongue: Normal  Mallampati: II      Dental:  Dental Findings: In tact   Chest/Lungs:  Chest/Lungs Clear    Heart/Vascular:  Heart Findings: Normal Heart murmur: negative       Mental Status:  Mental Status Findings:  Cooperative, Alert and Oriented         Anesthesia Plan  Type of Anesthesia, risks & benefits discussed:  Anesthesia Type:  general  Patient's Preference:   Intra-op Monitoring Plan: standard ASA monitors  Intra-op Monitoring Plan Comments:   Post Op Pain Control Plan: multimodal analgesia  Post Op Pain Control Plan Comments:   Induction:   IV  Beta Blocker:  Patient is not currently on a Beta-Blocker (No further documentation required).       Informed Consent: Patient understands risks and agrees with Anesthesia plan.  Questions answered. Anesthesia consent signed with patient.  ASA Score: 3     Day of Surgery Review of History & Physical: I have interviewed and examined the patient. I have reviewed the patient's H&P dated:     H&P update referred to the surgeon.

## 2019-12-05 NOTE — ANESTHESIA POSTPROCEDURE EVALUATION
Anesthesia Post Evaluation    Patient: Neha Vázquez    Procedure(s) Performed: Procedure(s) (LRB):  LITHOTRIPSY, USING LASER (Right)  CYSTOSCOPY, WITH URETERAL STENT REMOVAL (Right)  EXTRACTION - STONE (Right)  CYSTOSCOPY, WITH URETERAL STENT INSERTION (Right)    Final Anesthesia Type: general    Patient location during evaluation: PACU  Patient participation: Yes- Able to Participate  Level of consciousness: awake and alert, oriented and awake  Post-procedure vital signs: reviewed and stable  Pain management: adequate  Airway patency: patent    PONV status at discharge: No PONV  Anesthetic complications: no      Cardiovascular status: blood pressure returned to baseline  Respiratory status: unassisted and spontaneous ventilation  Hydration status: euvolemic  Follow-up not needed.          Vitals Value Taken Time   /74 12/5/2019  8:45 AM   Temp 36.8 °C (98.2 °F) 12/5/2019  8:45 AM   Pulse 97 12/5/2019  8:45 AM   Resp 18 12/5/2019  8:45 AM   SpO2 95 % 12/5/2019  8:45 AM         Event Time     Out of Recovery 08:15:00          Pain/Wiliam Score: Pain Rating Prior to Med Admin: 6 (12/5/2019  8:20 AM)  Wiliam Score: 10 (12/5/2019  8:45 AM)

## 2019-12-05 NOTE — PLAN OF CARE
Gave home care instructions to patient and mother, verbalized understanding.   All questions answered to the satisfaction of both.  To POV via WC, into POV without difficulty, distress or assist.

## 2019-12-05 NOTE — DISCHARGE SUMMARY
Date of Discharge: 12/05/2019     Principle Diagnosis: Ureteral stone    Secondary Diagnosis:  has a past medical history of Blood clot associated with vein wall inflammation (2018), Endocarditis (2018), HCV (hepatitis C virus), and IV drug abuse.    History of Present Illness: Pt was worked up in clinic and today's procedure was scheduled.  Please see H&P for full details.    Hospital Course: Pt presented on the day of surgery and after proper consents were obtained he was brought to the OR where his procedure was performed without difficulty.    Discharge Disposition: Home    Followup Plan:  1. Pt is provided with medications for pain and others as indicated.  2. RTC in 4 weeks

## 2019-12-05 NOTE — OP NOTE
Date of Procedure: 12/05/2019    PREOPERATIVE DIAGNOSIS:  Right ureteral stone.                                                                                                           POSTOPERATIVE DIAGNOSIS:  Right ureteral stone.                               PROCEDURES:                                                                  1.  Right ureteroscopy with laser lithotripsy and stone basket extraction.                          2.  Cystoscopy with placement of right double-J ureteral stent.              3.  Fluoro less than 1 hour.                                                 SURGEON:  Marbin Rosales IV, M.D.                                          Assistants: None    Specimen: None    Prosthetics, Devices, Grafts: None    ANESTHESIA:  General endotracheal.                                           FINDINGS:  The patient had an approximately 5mm stone in the distal right ureter which was obstructing.  Ureteroscopy was possible after placement of an appropriate guidewire. The stone was broken into small pieces. A stone basket was used, and all significant pieces were removed and brought to the back table for pathologic examination.  A 6-Czech x 26 cm double-J ureteral stent was placed. Flouroscopy was used throughout the case for wire and scope guidance, and if applicable to check final stent placement.                                    BLOOD LOSS:  None.                                                           BLOOD GIVEN:  None.                                                          URINE OUTPUT:  None.                                                         DRAINS:  6-Czech x 26 cm right double-J ureteral stent.                      PROCEDURE IN DETAIL:  After proper consents were obtained, the patient was brought to the Operating Room where he was prepped and draped in normal sterile fashion and provided general endotracheal anesthesia in dorsal lithotomy position.  A 22-Czech cystoscope was  assembled and introduced per urethra and the bladder was rinsed and drained.  Fluoroscopy was used to evaluate stone position at the start of the case and throughout the case for wire and scope guidance.    An attempt was made to gently pass a guidewire up the ureter, and the prior stent was grasped and removed and then the cystoscope was set aside with the guidewire in place.      The semirigid ureteroscope was then brought to bear and easily passed into the ureteral orifice.  The stone was obstructing the ureter and observed about 4 cm proximally.  The medium laser fiber was brought in and with 1 joules of energy and 5 repetitions per second, the stone was broken into a large number of small pieces.  It was observed that the pieces would not pass easily through the distal ureter, so a stone basket was used and with multiple passes into the ureter, the larger stone fragments were brought to the bladder or back table.  The ureteroscope was then set aside and the cystoscope replaced into the bladder over the wire.  A double-J ureteral stent was advanced coaxially over the wire and up to the renal pelvis, and there was an excellent curl on both ends when the wire was withdrawn, using fluoroscopic guidance.      String was left attached and later taped to the mons, so the patient can remove the stent at a later date.      The bladder was then rinsed and drained and stone fragments collected for pathologic examination.  After the bladder was drained, cystoscope was withdrawn and set aside.      The pt tolerated all of this well, and there were no complications.  she was awakened and transferred to Recovery in stable condition.

## 2019-12-05 NOTE — TELEPHONE ENCOUNTER
----- Message from Mark Rosales IV, MD sent at 12/5/2019  7:48 AM CST -----  RTC 4 wks with MELAIN/

## 2019-12-06 ENCOUNTER — NURSE TRIAGE (OUTPATIENT)
Dept: ADMINISTRATIVE | Facility: CLINIC | Age: 24
End: 2019-12-06

## 2019-12-06 ENCOUNTER — HOSPITAL ENCOUNTER (EMERGENCY)
Facility: HOSPITAL | Age: 24
Discharge: HOME | End: 2019-12-06
Attending: EMERGENCY MEDICINE
Payer: MEDICAID

## 2019-12-06 ENCOUNTER — TELEPHONE (OUTPATIENT)
Dept: UROLOGY | Facility: CLINIC | Age: 24
End: 2019-12-06

## 2019-12-06 VITALS
HEART RATE: 99 BPM | HEIGHT: 65 IN | WEIGHT: 215.25 LBS | SYSTOLIC BLOOD PRESSURE: 110 MMHG | OXYGEN SATURATION: 100 % | RESPIRATION RATE: 20 BRPM | TEMPERATURE: 99 F | BODY MASS INDEX: 35.86 KG/M2 | DIASTOLIC BLOOD PRESSURE: 70 MMHG

## 2019-12-06 DIAGNOSIS — N20.0 RIGHT RENAL STONE: Primary | ICD-10-CM

## 2019-12-06 DIAGNOSIS — T83.122A: ICD-10-CM

## 2019-12-06 DIAGNOSIS — Z72.0 TOBACCO USE: ICD-10-CM

## 2019-12-06 LAB
BACTERIA #/AREA URNS HPF: ABNORMAL /HPF
BILIRUB UR QL STRIP: ABNORMAL
CLARITY UR: ABNORMAL
COLOR UR: YELLOW
GLUCOSE UR QL STRIP: NEGATIVE
HGB UR QL STRIP: ABNORMAL
HYALINE CASTS #/AREA URNS LPF: 0 /LPF
KETONES UR QL STRIP: NEGATIVE
LEUKOCYTE ESTERASE UR QL STRIP: ABNORMAL
MICROSCOPIC COMMENT: ABNORMAL
NITRITE UR QL STRIP: NEGATIVE
PH UR STRIP: 6 [PH] (ref 5–8)
PROT UR QL STRIP: ABNORMAL
RBC #/AREA URNS HPF: >100 /HPF (ref 0–4)
SP GR UR STRIP: >=1.03 (ref 1–1.03)
SQUAMOUS #/AREA URNS HPF: 10 /HPF
URN SPEC COLLECT METH UR: ABNORMAL
UROBILINOGEN UR STRIP-ACNC: NEGATIVE EU/DL
WBC #/AREA URNS HPF: >100 /HPF (ref 0–5)
YEAST URNS QL MICRO: ABNORMAL

## 2019-12-06 PROCEDURE — 99284 EMERGENCY DEPT VISIT MOD MDM: CPT | Mod: 25

## 2019-12-06 PROCEDURE — 87088 URINE BACTERIA CULTURE: CPT

## 2019-12-06 PROCEDURE — 87106 FUNGI IDENTIFICATION YEAST: CPT

## 2019-12-06 PROCEDURE — 87086 URINE CULTURE/COLONY COUNT: CPT

## 2019-12-06 PROCEDURE — 81000 URINALYSIS NONAUTO W/SCOPE: CPT

## 2019-12-06 NOTE — TELEPHONE ENCOUNTER
Reason for Disposition   [1] Caller has URGENT question AND [2] triager unable to answer question    Additional Information   Negative: Sounds like a life-threatening emergency to the triager   Negative: [1] Widespread rash AND [2] bright red, sunburn-like   Negative: [1] Severe headache AND [2] after spinal (epidural) anesthesia   Negative: [1] Vomiting AND [2] persists > 4 hours   Negative: [1] Vomiting AND [2] abdomen looks much more swollen than usual   Negative: [1] Drinking very little AND [2] dehydration suspected (e.g., no urine > 12 hours, very dry mouth, very lightheaded)   Negative: Patient sounds very sick or weak to the triager   Negative: Sounds like a serious complication to the triager   Negative: Fever > 100.5 F (38.1 C)   Negative: [1] SEVERE post-op pain (e.g., excruciating, pain scale 8-10) AND [2] not controlled with pain medications    Protocols used: POST-OP SYMPTOMS AND [id number]    Pt called stated she accidentally pulled her stent out and now it is causing her a lot of pain. Pt stated its not out completely. Pt stated pain is a 6/10 and she can't close her legs. Pt also stated she was coming from under anesthesia when instructions for stent were told to her so she don't remember anything. Care advice recommend surgeon be paged. No one on call for Dr Rosales per . Pt sent to ER.

## 2019-12-06 NOTE — ED NOTES
Patient had urinary stent placed yesterday, reports that this morning when wiping following void she pulled on incision site resulting in pain unrelieved by pain medication. Patient reports concern that it may have caused bleeding at site (patient is on her menstrual cycle at this time).

## 2019-12-06 NOTE — TELEPHONE ENCOUNTER
Received message that pt was having a post op problem in that she mistakenly pulled out her stent and was in pain; pt was sent to ER. I attempted to call pt to check on her but no answer.  Msg left asking her to call back.

## 2019-12-06 NOTE — ED PROVIDER NOTES
"SCRIBE #1 NOTE: I, Nj Preciado, am scribing for, and in the presence of, Yun Canales DO. I have scribed the entire note.      History      Chief Complaint   Patient presents with    Post-op Problem     reports having a ureter stent placed yesterday, states last night wire was accidently pulled and now having severe pain, unable to sit, unknown if blood in urine d/t pt being on mentrual cycle        Review of patient's allergies indicates:  No Known Allergies     HPI   HPI    12/6/2019, 1:01 PM   History obtained from the patient      History of Present Illness: Neha Vázquez is a 24 y.o. female patient who presents to the Emergency Department for post-op problem, onset 1 day PTA. Pt had a kidney stone and Dr. Rosales put a stent in yesterday. Pt reports she wiped after she was discharged yesterday and she accidentally pulled the "wire out." Pt states she is unable to sit. Symptoms are constant and moderate in severity. No mitigating or exacerbating factors reported. Associated sxs include pain (7/10 in severity) and nausea. Patient denies any fever, chills, vomiting, diarrhea, constipation, weakness, and all other sxs at this time. Prior Tx includes Hydrocodone. No further complaints or concerns at this time.  Patient currently is on her menses.        Arrival mode: Personal vehicle      PCP: [facility]       Past Medical History:  Past Medical History:   Diagnosis Date    Blood clot associated with vein wall inflammation 2018    from PICC line    Endocarditis 2018    HCV (hepatitis C virus)     IV drug abuse        Past Surgical History:  Past Surgical History:   Procedure Laterality Date    CYSTOSCOPY W/ URETERAL STENT PLACEMENT Right 11/7/2019    Procedure: CYSTOSCOPY, WITH URETERAL STENT INSERTION;  Surgeon: Mark Rosales IV, MD;  Location: [facility] OR;  Service: Urology;  Laterality: Right;    CYSTOSCOPY W/ URETERAL STENT PLACEMENT Right 12/5/2019    Procedure: CYSTOSCOPY, " "WITH URETERAL STENT INSERTION;  Surgeon: Mark Rosales IV, MD;  Location: [facility] OR;  Service: Urology;  Laterality: Right;    CYSTOSCOPY W/ URETERAL STENT REMOVAL Right 2019    Procedure: CYSTOSCOPY, WITH URETERAL STENT REMOVAL;  Surgeon: Mark Rosales IV, MD;  Location: [facility] OR;  Service: Urology;  Laterality: Right;    INCISION AND DRAINAGE Right     arm    INDUCED   2015    LASER LITHOTRIPSY Right 2019    Procedure: LITHOTRIPSY, USING LASER;  Surgeon: Mark Rosales IV, MD;  Location: [facility] OR;  Service: Urology;  Laterality: Right;    RETROGRADE PYELOGRAPHY N/A 2019    Procedure: PYELOGRAM, RETROGRADE;  Surgeon: Mark Rosales IV, MD;  Location: [facility] OR;  Service: Urology;  Laterality: N/A;         Family History:  Family History   Problem Relation Age of Onset    Heart disease Maternal Grandfather        Social History:  Social History     Tobacco Use    Smoking status: Current Every Day Smoker    Smokeless tobacco: Never Used    Tobacco comment: no smoking after m.n prior to sx   Substance and Sexual Activity    Alcohol use: No    Drug use: Not Currently     Types: "Crack" cocaine, Methamphetamines     Comment: sober since 10/7/19    Sexual activity: Yes     Partners: Male       ROS   Review of Systems   Constitutional: Negative for chills and fever.   HENT: Negative for sore throat.    Respiratory: Negative for shortness of breath.    Cardiovascular: Negative for chest pain.   Gastrointestinal: Positive for nausea. Negative for abdominal pain, constipation, diarrhea and vomiting.   Genitourinary: Positive for pelvic pain. Negative for dysuria.   Musculoskeletal: Negative for back pain.   Skin: Negative for rash.   Neurological: Negative for dizziness, weakness, numbness and headaches.   Hematological: Does not bruise/bleed easily.   All other systems reviewed and are negative.      Physical Exam      Initial Vitals [19 1207]   BP Pulse Resp Temp SpO2 " "  114/66 106 18 99 °F (37.2 °C) 97 %      MAP       --          Physical Exam  Nursing Notes and Vital Signs Reviewed.  Constitutional: Patient is in mild distress. Well-developed and well-nourished.  Head: Atraumatic. Normocephalic.  Eyes: PERRL. EOM intact. Conjunctivae are not pale. No scleral icterus.  ENT: Mucous membranes are moist. Oropharynx is clear and symmetric.    Neck: Supple. Full ROM. No lymphadenopathy.  Cardiovascular: Regular rate. Regular rhythm. No murmurs, rubs, or gallops. Distal pulses are 2+ and symmetric.  Pulmonary/Chest: No respiratory distress. Clear to auscultation bilaterally. No wheezing or rales.  Abdominal: Soft and non-distended.  There is no tenderness.  No rebound, guarding, or rigidity. Good bowel sounds.  Pelvic: A female chaperone was present for this examination. String appeared to be out the urethra. No stent visible.  There is a string that emanates from the urethra.  Pt on menstrual cycle.  Nl external inspection. No lesions or abnormalities were visible on the labia majora or minora.  Musculoskeletal: Moves all extremities. No obvious deformities. No edema. No calf tenderness.  Skin: Warm and dry.  Neurological:  Alert, awake, and appropriate.  Normal speech.  No acute focal neurological deficits are appreciated.  Psychiatric: Normal affect. Good eye contact. Appropriate in content.    ED Course    Procedures  ED Vital Signs:  Vitals:    12/06/19 1207 12/06/19 1337   BP: 114/66 110/70   Pulse: 106 99   Resp: 18 20   Temp: 99 °F (37.2 °C) 98.7 °F (37.1 °C)   TempSrc: Oral Oral   SpO2: 97% 100%   Weight: 97.7 kg (215 lb 4.5 oz)    Height: 5' 5" (1.651 m)        Abnormal Lab Results:  Labs Reviewed   URINALYSIS, REFLEX TO URINE CULTURE - Abnormal; Notable for the following components:       Result Value    Appearance, UA Cloudy (*)     Specific Gravity, UA >=1.030 (*)     Protein, UA 2+ (*)     Bilirubin (UA) 1+ (*)     Occult Blood UA 3+ (*)     Leukocytes, UA 2+ (*)     All " other components within normal limits    Narrative:     Preferred Collection Type->Urine, Clean Catch   URINALYSIS MICROSCOPIC - Abnormal; Notable for the following components:    RBC, UA >100 (*)     WBC, UA >100 (*)     Bacteria Few (*)     Yeast, UA Few (*)     All other components within normal limits    Narrative:     Preferred Collection Type->Urine, Clean Catch   CULTURE, URINE        All Lab Results:  Results for orders placed or performed during the hospital encounter of 12/06/19   Urinalysis, Reflex to Urine Culture Urine, Clean Catch   Result Value Ref Range    Specimen UA Urine, Clean Catch     Color, UA Yellow Yellow, Straw, Destiny    Appearance, UA Cloudy (A) Clear    pH, UA 6.0 5.0 - 8.0    Specific Gravity, UA >=1.030 (A) 1.005 - 1.030    Protein, UA 2+ (A) Negative    Glucose, UA Negative Negative    Ketones, UA Negative Negative    Bilirubin (UA) 1+ (A) Negative    Occult Blood UA 3+ (A) Negative    Nitrite, UA Negative Negative    Urobilinogen, UA Negative <2.0 EU/dL    Leukocytes, UA 2+ (A) Negative   Urinalysis Microscopic   Result Value Ref Range    RBC, UA >100 (H) 0 - 4 /hpf    WBC, UA >100 (H) 0 - 5 /hpf    Bacteria Few (A) None-Occ /hpf    Yeast, UA Few (A) None    Squam Epithel, UA 10 /hpf    Hyaline Casts, UA 0 0-1/lpf /lpf    Microscopic Comment SEE COMMENT          Imaging Results:  Imaging Results          X-Ray Abdomen AP 1 View (KUB) (Final result)  Result time 12/06/19 13:10:05    Final result by Cheo Anderson MD (12/06/19 13:10:05)                 Impression:      Stable positioning of a right-sided double-J ureteral catheter.      Electronically signed by: Cheo Anderson  Date:    12/06/2019  Time:    13:10             Narrative:    EXAMINATION:  XR ABDOMEN AP 1 VIEW    CLINICAL HISTORY:  Displacement of indwelling ureteral stent, initial encounter    TECHNIQUE:  AP View(s) of the abdomen was performed.    COMPARISON:  Abdominal radiograph 11/06/2019; fluoroscopic images  12/05/2019    FINDINGS:  A right-sided double-J ureteral stent is identified.  Similar appearance of the stent as on prior fluoroscopic image.  Nonspecific bowel gas pattern without evidence of obstruction.  No pathologic calcification or mass in the abdomen.  Osseous structures appear within normal limits                                        The Emergency Provider reviewed the vital signs and test results, which are outlined above.    ED Discussion     1:25 PM: Discussed pt's case with Dr. Rosales (urology):  Stent appears to be in good position.  Patient is currently on Cipro.  At this time, he does not recommend treatment changes.  Recommend that she follow up with him in the clinic on Monday.    1:34 PM: Reassessed pt at this time.   Discussed with pt all pertinent ED information and results. Discussed pt dx and plan of tx. Gave pt all f/u and return to the ED instructions. All questions and concerns were addressed at this time. Pt expresses understanding of information and instructions, and is comfortable with plan to discharge. Pt is stable for discharge.    I discussed with patient and/or family/caretaker that evaluation in the ED does not suggest any emergent or life threatening medical conditions requiring immediate intervention beyond what was provided in the ED, and I believe patient is safe for discharge.  Regardless, an unremarkable evaluation in the ED does not preclude the development or presence of a serious of life threatening condition. As such, patient was instructed to return immediately for any worsening or change in current symptoms.      ED Medication(s):  Medications - No data to display    Follow-up Information     Mark Rosales IV, MD In 3 days.    Specialty:  Urology  Why:  Return to emergency department for:  Fever, nausea, vomiting, difficulty urinating, or other concerns.  Contact information:  [address] [place] [address] [address] Renee [place] [address] [phone]                   Discharge  Medication List as of 12/6/2019  1:34 PM          Medical Decision Making    Medical Decision Making:   Clinical Tests:   Lab Tests: Ordered and Reviewed  Radiological Study: Ordered and Reviewed     Additional MDM:   Smoking Cessation: The patient is a smoker. The patient was counseled on smoking cessation for: 3 minutes. The patient was counseled on tobacco related  health complications. Appropriate patient literature was given to the patient concerning tobacco cessation.        Scribe Attestation:   Scribe #1: I performed the above scribed service and the documentation accurately describes the services I performed. I attest to the accuracy of the note.    Attending:   Physician Attestation Statement for Scribe #1: I, Yun Canales DO, personally performed the services described in this documentation, as scribed by Nj Preciado, in my presence, and it is both accurate and complete.          Clinical Impression       ICD-10-CM ICD-9-CM   1. Right renal stone N20.0 592.0   2. Ureteral stent displacement, not  displaced per xray T83.122A 996.76   3. Tobacco use Z72.0 305.1       Disposition:   Disposition: Discharged  Condition: Stable         Yun Canales DO  12/06/19 2134

## 2019-12-08 LAB — BACTERIA UR CULT: ABNORMAL

## 2019-12-09 ENCOUNTER — HOSPITAL ENCOUNTER (EMERGENCY)
Facility: HOSPITAL | Age: 24
Discharge: HOME | End: 2019-12-09
Attending: EMERGENCY MEDICINE
Payer: MEDICAID

## 2019-12-09 VITALS
OXYGEN SATURATION: 96 % | TEMPERATURE: 98 F | HEART RATE: 93 BPM | RESPIRATION RATE: 18 BRPM | WEIGHT: 219.13 LBS | SYSTOLIC BLOOD PRESSURE: 126 MMHG | DIASTOLIC BLOOD PRESSURE: 76 MMHG | BODY MASS INDEX: 36.47 KG/M2

## 2019-12-09 DIAGNOSIS — T83.122A MIGRATION OF URETERAL STENT, INITIAL ENCOUNTER: ICD-10-CM

## 2019-12-09 DIAGNOSIS — Z96.0 STATUS POST PLACEMENT OF URETERAL STENT: Primary | ICD-10-CM

## 2019-12-09 LAB
BACTERIA #/AREA URNS HPF: ABNORMAL /HPF
BILIRUB UR QL STRIP: NEGATIVE
CLARITY UR: ABNORMAL
COLOR UR: YELLOW
COMPN STONE: NORMAL
GLUCOSE UR QL STRIP: NEGATIVE
HGB UR QL STRIP: ABNORMAL
HYALINE CASTS #/AREA URNS LPF: 0 /LPF
KETONES UR QL STRIP: NEGATIVE
LEUKOCYTE ESTERASE UR QL STRIP: ABNORMAL
MICROSCOPIC COMMENT: ABNORMAL
NITRITE UR QL STRIP: NEGATIVE
PH UR STRIP: 7 [PH] (ref 5–8)
PROT UR QL STRIP: ABNORMAL
RBC #/AREA URNS HPF: 10 /HPF (ref 0–4)
SP GR UR STRIP: 1.02 (ref 1–1.03)
SPECIMEN SOURCE: NORMAL
STONE ANALYSIS IR-IMP: NORMAL
URN SPEC COLLECT METH UR: ABNORMAL
UROBILINOGEN UR STRIP-ACNC: NEGATIVE EU/DL
WBC #/AREA URNS HPF: 25 /HPF (ref 0–5)
WBC CLUMPS URNS QL MICRO: ABNORMAL

## 2019-12-09 PROCEDURE — 87077 CULTURE AEROBIC IDENTIFY: CPT

## 2019-12-09 PROCEDURE — 87184 SC STD DISK METHOD PER PLATE: CPT

## 2019-12-09 PROCEDURE — 87186 SC STD MICRODIL/AGAR DIL: CPT

## 2019-12-09 PROCEDURE — 81000 URINALYSIS NONAUTO W/SCOPE: CPT

## 2019-12-09 PROCEDURE — 99284 EMERGENCY DEPT VISIT MOD MDM: CPT | Mod: 25

## 2019-12-09 PROCEDURE — 87088 URINE BACTERIA CULTURE: CPT

## 2019-12-09 PROCEDURE — 87086 URINE CULTURE/COLONY COUNT: CPT

## 2019-12-09 NOTE — ED PROVIDER NOTES
"SCRIBE #1 NOTE: I, Betty Babb, am scribing for, and in the presence of, Chely Newman MD. I have scribed the entire note.       History     Chief Complaint   Patient presents with    Urinary Frequency     Stent placed on dec 5th for kidney stones. stent falling out. pt unable to hold urine, states "I keep urinating on myself." supposed to have stent removed on 1/6.      Review of patient's allergies indicates:  No Known Allergies      History of Present Illness     HPI    12/9/2019, 1:04 PM  History obtained from the patient      History of Present Illness: Neha Vázquez is a 24 y.o. female patient with a PMHx of HCV, endocarditis and PSHx of cytoscopy with ureteral stent placement who presents to the Emergency Department for evaluation of increased urinary frequency which onset gradually last night. Pt reports she is urinating on herself and noticed a blue tube hanging out. Pt had a stent placed on 12/5 by Dr. Rosales (Urology). She was evaluated in the ED on 12/6 for similar sxs and possible displacement of ureteral stent. She had an appointment scheduled today with Dr. Rosales but states she forgot. Symptoms are episodic and moderate in severity. No mitigating or exacerbating factors reported. No associated sxs included. Patient denies any fever, chills, SOB, CP, abd pain, n/v/d, constipation, hematochezia, dysuria, urinary urgency, hematuria, dizziness, extremity weakness/numbness, and all other sxs at this time. No prior Tx. No further complaints or concerns at this time.       Arrival mode: Personal vehicle     PCP: [facility]        Past Medical History:  Past Medical History:   Diagnosis Date    Blood clot associated with vein wall inflammation 2018    from PICC line    Endocarditis 2018    HCV (hepatitis C virus)     IV drug abuse        Past Surgical History:  Past Surgical History:   Procedure Laterality Date    CYSTOSCOPY W/ URETERAL STENT PLACEMENT Right " "2019    Procedure: CYSTOSCOPY, WITH URETERAL STENT INSERTION;  Surgeon: Mark Rosales IV, MD;  Location: [facility];  Service: Urology;  Laterality: Right;    CYSTOSCOPY W/ URETERAL STENT PLACEMENT Right 2019    Procedure: CYSTOSCOPY, WITH URETERAL STENT INSERTION;  Surgeon: Mark Rosales IV, MD;  Location: [facility];  Service: Urology;  Laterality: Right;    CYSTOSCOPY W/ URETERAL STENT REMOVAL Right 2019    Procedure: CYSTOSCOPY, WITH URETERAL STENT REMOVAL;  Surgeon: Mark Rosales IV, MD;  Location: [facility] OR;  Service: Urology;  Laterality: Right;    INCISION AND DRAINAGE Right     arm    INDUCED   2015    LASER LITHOTRIPSY Right 2019    Procedure: LITHOTRIPSY, USING LASER;  Surgeon: Mark Rosales IV, MD;  Location: [facility] OR;  Service: Urology;  Laterality: Right;    RETROGRADE PYELOGRAPHY N/A 2019    Procedure: PYELOGRAM, RETROGRADE;  Surgeon: Mark Rosales IV, MD;  Location: [facility];  Service: Urology;  Laterality: N/A;         Family History:  Family History   Problem Relation Age of Onset    Heart disease Maternal Grandfather        Social History:  Social History     Tobacco Use    Smoking status: Current Every Day Smoker    Smokeless tobacco: Never Used    Tobacco comment: no smoking after m.n prior to sx   Substance and Sexual Activity    Alcohol use: No    Drug use: Not Currently     Types: "Crack" cocaine, Methamphetamines     Comment: sober since 10/7/19    Sexual activity: Yes     Partners: Male        Review of Systems     Review of Systems   Constitutional: Negative for chills and fever.   HENT: Negative for sore throat.    Respiratory: Negative for cough and shortness of breath.    Cardiovascular: Negative for chest pain.   Gastrointestinal: Negative for abdominal pain, blood in stool, constipation, diarrhea, nausea and vomiting.   Genitourinary: Positive for frequency (increased). Negative for dysuria, hematuria and urgency. "   Musculoskeletal: Negative for back pain.   Skin: Negative for rash.   Neurological: Negative for dizziness, weakness and numbness.   All other systems reviewed and are negative.       Physical Exam     Initial Vitals [12/09/19 1254]   BP Pulse Resp Temp SpO2   126/76 93 18 98.1 °F (36.7 °C) 96 %      MAP       --          Physical Exam  Nursing Notes and Vital Signs Reviewed.  Constitutional: Patient is in no acute distress. Well-developed and well-nourished.  Head: Atraumatic. Normocephalic.  Eyes: PERRL. EOM intact. Conjunctivae are not pale. No scleral icterus.  ENT: Mucous membranes are moist. Oropharynx is clear and symmetric.    Neck: Supple. Full ROM. No lymphadenopathy.  Cardiovascular: Regular rate. Regular rhythm. No murmurs, rubs, or gallops. Distal pulses are 2+ and symmetric.  Pulmonary/Chest: No respiratory distress. Clear to auscultation bilaterally. No wheezing or rales.  Abdominal: Soft and non-distended.  There is no tenderness.  No rebound, guarding, or rigidity. Good bowel sounds.  Genitourinary: No CVA tenderness. Small amount of urethral stent protruding from urethra.  Musculoskeletal: Moves all extremities. No obvious deformities. No edema. No calf tenderness.  Skin: Warm and dry.  Neurological:  Alert, awake, and appropriate.  Normal speech.  No acute focal neurological deficits are appreciated.  Psychiatric: Normal affect. Good eye contact. Appropriate in content.     ED Course   Procedures  ED Vital Signs:  Vitals:    12/09/19 1254   BP: 126/76   Pulse: 93   Resp: 18   Temp: 98.1 °F (36.7 °C)   TempSrc: Oral   SpO2: 96%   Weight: 99.4 kg (219 lb 2.2 oz)       Abnormal Lab Results:  Labs Reviewed   URINALYSIS, REFLEX TO URINE CULTURE - Abnormal; Notable for the following components:       Result Value    Appearance, UA Hazy (*)     Protein, UA 1+ (*)     Occult Blood UA 2+ (*)     Leukocytes, UA 2+ (*)     All other components within normal limits    Narrative:     Preferred Collection  Type->Urine, Clean Catch   URINALYSIS MICROSCOPIC - Abnormal; Notable for the following components:    RBC, UA 10 (*)     WBC, UA 25 (*)     WBC Clumps, UA Few (*)     Bacteria Few (*)     All other components within normal limits    Narrative:     Preferred Collection Type->Urine, Clean Catch   CULTURE, URINE        All Lab Results:  Results for orders placed or performed during the hospital encounter of 12/09/19   Urinalysis, Reflex to Urine Culture Urine, Clean Catch   Result Value Ref Range    Specimen UA Urine, Clean Catch     Color, UA Yellow Yellow, Straw, Destiny    Appearance, UA Hazy (A) Clear    pH, UA 7.0 5.0 - 8.0    Specific Gravity, UA 1.020 1.005 - 1.030    Protein, UA 1+ (A) Negative    Glucose, UA Negative Negative    Ketones, UA Negative Negative    Bilirubin (UA) Negative Negative    Occult Blood UA 2+ (A) Negative    Nitrite, UA Negative Negative    Urobilinogen, UA Negative <2.0 EU/dL    Leukocytes, UA 2+ (A) Negative   Urinalysis Microscopic   Result Value Ref Range    RBC, UA 10 (H) 0 - 4 /hpf    WBC, UA 25 (H) 0 - 5 /hpf    WBC Clumps, UA Few (A) None-Rare    Bacteria Few (A) None-Occ /hpf    Hyaline Casts, UA 0 0-1/lpf /lpf    Microscopic Comment SEE COMMENT        Imaging Results:  Imaging Results          X-Ray Abdomen AP 1 View (KUB) (Final result)  Result time 12/09/19 13:55:28    Final result by Cheo Anderson MD (12/09/19 13:55:28)                 Impression:      Right-sided double-J ureteral stent with interval migration of the distal coil below the level of the pubic symphysis, possibly within the proximal urethra.      Electronically signed by: Cheo Anderson  Date:    12/09/2019  Time:    13:55             Narrative:    EXAMINATION:  XR ABDOMEN AP 1 VIEW    CLINICAL HISTORY:  Presence of urogenital implants    TECHNIQUE:  AP View(s) of the abdomen was performed.    COMPARISON:  Abdominal radiograph 12/06/2019    FINDINGS:  Right double-J ureteral catheter again identified.   Proximal coil appears in stable position.  Distal coil has migrated inferiorly since the prior now below the pubic symphysis, possibly within the urethra.  No evidence of bowel obstruction.  No pathologic calcification or mass within the abdomen.  Osseous structures appear intact.                                        The Emergency Provider reviewed the vital signs and test results, which are outlined above.     ED Discussion     2:11 PM: Discussed pt's case with Dr. Rosales (Urology) who recommends pulling out the stent.    2:20 PM: Instructed to pull out stent per Dr. Rosales. Stent is hanging out of urethra and was pulled out without any difficulty.    2:23 PM: Reassessed pt at this time. Discussed with pt all pertinent ED information and results. Discussed pt dx and plan of tx. Gave pt all f/u and return to the ED instructions. All questions and concerns were addressed at this time. Pt expresses understanding of information and instructions, and is comfortable with plan to discharge. Pt is stable for discharge.    I discussed with patient and/or family/caretaker that evaluation in the ED does not suggest any emergent or life threatening medical conditions requiring immediate intervention beyond what was provided in the ED, and I believe patient is safe for discharge.  Regardless, an unremarkable evaluation in the ED does not preclude the development or presence of a serious of life threatening condition. As such, patient was instructed to return immediately for any worsening or change in current symptoms.         Medical Decision Making:   Clinical Tests:   Lab Tests: Ordered and Reviewed  Radiological Study: Ordered and Reviewed     Additional MDM:   Smoking Cessation: The patient is a smoker. The patient was counseled on smoking cessation for: 3 minutes. The patient was counseled on tobacco related  health complications. The patient was counseled on how exercise will aide in tobacco cessation. The patient  was counseled on hazards of smoking while driving. The patient was counseled on the adverse effects of second hand smoke.        ED Medication(s):  Medications - No data to display    Discharge Medication List as of 12/9/2019  2:25 PM          Follow-up Information     Mark Rosales IV, MD. Schedule an appointment as soon as possible for a visit in 1 day.    Specialty:  Urology  Why:  Return to the Emergency Room, If symptoms worsen  Contact information:  [address] [place] [address] [address] Renee [place] [address]  [phone]                       Scribe Attestation:   Scribe #1: I performed the above scribed service and the documentation accurately describes the services I performed. I attest to the accuracy of the note.     Attending:   Physician Attestation Statement for Scribe #1: I, Chely Newman MD, personally performed the services described in this documentation, as scribed by Betty Babb, in my presence, and it is both accurate and complete.           Clinical Impression       ICD-10-CM ICD-9-CM   1. Status post placement of ureteral stent Z96.0 V45.89   2. Migration of ureteral stent, initial encounter T83.122A 996.76       Disposition:   Disposition: Discharged  Condition: Stable       Chely Newman MD  12/09/19 3972

## 2019-12-10 ENCOUNTER — TELEPHONE (OUTPATIENT)
Dept: UROLOGY | Facility: CLINIC | Age: 24
End: 2019-12-10

## 2019-12-10 NOTE — TELEPHONE ENCOUNTER
Pt was in ER yesterday were her stents were removed. She was discharged in stable condition. Her post op appts are scheduled for January. Do you need to see before then? If not, is pt cleared to enter a 30 day treatment program for substance abuse? Please advise.

## 2019-12-10 NOTE — LETTER
December 11, 2019      [facility]  [address] [place] [id number]  Phone: [phone]  Fax: [phone]       Patient: Neha Vázquez   YOB: 1995  Date of Visit: 12/11/2019    To Whom It May Concern:    Aimee Vázquez is cleared to enter 30 day program.  If you have any questions or concerns, or if I can be of further assistance, please do not hesitate to contact me.    Sincerely,    Dr. Mark Rosales/Monse Russ LPN

## 2019-12-10 NOTE — TELEPHONE ENCOUNTER
----- Message from Shawna Shoemaker sent at 12/10/2019  3:13 PM CST -----  Contact: pt   .Type:  Needs Medical Advice    Who Called: TRICIA DAS   Symptoms (please be specific):   How long has patient had these symptoms:   Pharmacy name and phone #:   Would the patient rather a call back or a response via [facility]? Call   Best Call Back Number:  [phone] (home)  Or [phone]  Additional Information: Pt is requesting a call back from the nurse in regards to the pt being cleared to enter a rehab

## 2019-12-11 NOTE — TELEPHONE ENCOUNTER
----- Message from Roxanne Jha sent at 12/11/2019  2:49 PM CST -----  Contact: pt   Stated she has question about going to rehab, she can be reached at [phone]

## 2019-12-11 NOTE — TELEPHONE ENCOUNTER
Spoke with pt, explained Dr. Rosales is ok with her entering in a 30 day substance abuse program and then following up with us afterward. She verbalized understanding. Provided letter as to such. States she will pick it up in the morning.

## 2019-12-12 LAB
FINAL PATHOLOGIC DIAGNOSIS: NORMAL
GROSS: NORMAL

## 2019-12-13 ENCOUNTER — TELEPHONE (OUTPATIENT)
Dept: EMERGENCY MEDICINE | Facility: HOSPITAL | Age: 24
End: 2019-12-13

## 2019-12-13 LAB — BACTERIA UR CULT: ABNORMAL

## 2019-12-13 NOTE — TELEPHONE ENCOUNTER
Not able to contact per phone, messages left.  Ceftin phoned in to Elen at [phone] with instructions to stop Cipro and Start Ceftin.  Will f/u

## 2019-12-16 ENCOUNTER — TELEPHONE (OUTPATIENT)
Dept: UROLOGY | Facility: CLINIC | Age: 24
End: 2019-12-16

## 2019-12-16 NOTE — TELEPHONE ENCOUNTER
----- Message from Shawna Shoemaker sent at 12/16/2019 11:00 AM CST -----  Contact: pt   Who Called: TRICIA DAS   Symptoms (please be specific):   How long has patient had these symptoms:   Pharmacy name and phone #:   Would the patient rather a call back or a response via [facility]? Call   Best Call Back Number:  [phone] (home)    Additional Information: Pt is requesting a call back from the nurse in regards to the pt needing a letter saying that she is cleared to go to treatment pt will have the fax number when the nurse calls her back

## 2020-01-22 ENCOUNTER — TELEPHONE (OUTPATIENT)
Dept: RADIOLOGY | Facility: HOSPITAL | Age: 25
End: 2020-01-22

## 2020-01-23 ENCOUNTER — HOSPITAL ENCOUNTER (OUTPATIENT)
Dept: RADIOLOGY | Facility: HOSPITAL | Age: 25
Discharge: HOME | End: 2020-01-23
Attending: UROLOGY
Payer: MEDICAID

## 2020-01-23 DIAGNOSIS — N20.1 URETEROLITHIASIS: ICD-10-CM

## 2020-01-23 DIAGNOSIS — N20.1 URETERAL STONE: ICD-10-CM

## 2020-01-23 PROCEDURE — 74018 XR ABDOMEN AP 1 VIEW: ICD-10-PCS | Mod: 26,,, | Performed by: RADIOLOGY

## 2020-01-23 PROCEDURE — 74018 RADEX ABDOMEN 1 VIEW: CPT | Mod: 26,,, | Performed by: RADIOLOGY

## 2020-01-23 PROCEDURE — 74018 RADEX ABDOMEN 1 VIEW: CPT | Mod: TC

## 2020-01-23 PROCEDURE — 76770 US EXAM ABDO BACK WALL COMP: CPT | Mod: TC

## 2020-01-23 PROCEDURE — 76770 US EXAM ABDO BACK WALL COMP: CPT | Mod: 26,,, | Performed by: RADIOLOGY

## 2020-01-23 PROCEDURE — 76770 US RETROPERITONEAL COMPLETE: ICD-10-PCS | Mod: 26,,, | Performed by: RADIOLOGY

## 2020-01-29 ENCOUNTER — OFFICE VISIT (OUTPATIENT)
Dept: UROLOGY | Facility: CLINIC | Age: 25
End: 2020-01-29
Payer: MEDICAID

## 2020-01-29 VITALS
HEIGHT: 65 IN | HEART RATE: 89 BPM | BODY MASS INDEX: 37.83 KG/M2 | SYSTOLIC BLOOD PRESSURE: 134 MMHG | DIASTOLIC BLOOD PRESSURE: 78 MMHG | WEIGHT: 227.06 LBS

## 2020-01-29 DIAGNOSIS — N20.0 RENAL STONE: Primary | ICD-10-CM

## 2020-01-29 LAB
BILIRUB SERPL-MCNC: NORMAL MG/DL
BLOOD URINE, POC: NORMAL
COLOR, POC UA: YELLOW
GLUCOSE UR QL STRIP: NORMAL
KETONES UR QL STRIP: NORMAL
LEUKOCYTE ESTERASE URINE, POC: NORMAL
NITRITE, POC UA: NORMAL
PH, POC UA: 7
PROTEIN, POC: NORMAL
SPECIFIC GRAVITY, POC UA: 1
UROBILINOGEN, POC UA: NORMAL

## 2020-01-29 PROCEDURE — 99999 PR PBB SHADOW E&M-EST. PATIENT-LVL III: ICD-10-PCS | Mod: PBBFAC,,, | Performed by: UROLOGY

## 2020-01-29 PROCEDURE — 99999 PR PBB SHADOW E&M-EST. PATIENT-LVL III: CPT | Mod: PBBFAC,,, | Performed by: UROLOGY

## 2020-01-29 PROCEDURE — 99024 PR POST-OP FOLLOW-UP VISIT: ICD-10-PCS | Mod: ,,, | Performed by: UROLOGY

## 2020-01-29 PROCEDURE — 99213 OFFICE O/P EST LOW 20 MIN: CPT | Mod: PBBFAC | Performed by: UROLOGY

## 2020-01-29 PROCEDURE — 99024 POSTOP FOLLOW-UP VISIT: CPT | Mod: ,,, | Performed by: UROLOGY

## 2020-01-29 PROCEDURE — 81002 URINALYSIS NONAUTO W/O SCOPE: CPT | Mod: PBBFAC | Performed by: UROLOGY

## 2020-01-29 NOTE — PROGRESS NOTES
Chief Complaint: Right ureteral stone    HPI:   20: KUB/US shows right stone cleared and no problems after stent out.  There is a 5mm left renal stone and she has had intermittent left flank.  19: Right ureteral stent placed two weeks ago, ready for staged URS.  19: 22 yo woman presented to ER 2d ago with right ureteral stone and low suspicion for infection but after observation she has continued low grade fever and likely has a pyelonephritis present on admission that has not fully developed into a worse problem yet.  Has not had prior stones.  Was in a rehab facility for the last month and at the end of the 28 day program was advised to come to the ER for right flank pain.  This pain has been well controlled.    Allergies:  Patient has no known allergies.    Medications: has a current medication list which includes the following prescription(s): naltrexone, hydrochlorothiazide, naltrexone microspheres, potassium citrate, and tamsulosin.    Review of Systems:  General: No fever, chills, fatigability, or weight loss.  Skin: No rashes, itching, or changes in color or texture of skin.  Chest: Denies ELAM, cyanosis, wheezing, cough, and sputum production.  Abdomen: Appetite fine. No weight loss. Denies diarrhea, abdominal pain, hematemesis, or blood in stool.  Musculoskeletal: No joint stiffness or swelling. Denies back pain.  : As above.  All other review of systems negative.    PMH:   has a past medical history of Blood clot associated with vein wall inflammation (), Endocarditis (2018), HCV (hepatitis C virus), and IV drug abuse.    PSH:   has a past surgical history that includes Cystoscopy w/ ureteral stent placement (Right, 2019); Retrograde pyelography (N/A, 2019); Induced  (); Incision and drainage (Right); Laser lithotripsy (Right, 2019); Cystoscopy w/ ureteral stent removal (Right, 2019); and Cystoscopy w/ ureteral stent placement (Right,  "12/5/2019).    FamHx: family history includes Heart disease in her maternal grandfather.    SocHx:  reports that she has been smoking. She has never used smokeless tobacco. She reports that she has current or past drug history. Drugs: "Crack" cocaine and Methamphetamines. She reports that she does not drink alcohol.     Physical Exam:  Vitals:   Vitals:    01/29/20 1456   BP: 134/78   Pulse: 89     General: A&Ox3. No apparent distress. No deformities.  Neck: No masses. Normal thyroid.  Lungs: normal inspiration. No use of accessory muscles.  Heart: normal pulse. No arrhythmias.  Abdomen: Soft. NT. ND  Skin: The skin is warm and dry. No jaundice.  Ext: No c/c/e.  : deferred    Labs/Studies:     Impression/Plan:   1. CT RSS and RTC to discuss treatment of left renal stone as new unrelated activities separate from the right stone previously treated.    "

## 2020-02-02 ENCOUNTER — HOSPITAL ENCOUNTER (OUTPATIENT)
Dept: RADIOLOGY | Facility: HOSPITAL | Age: 25
Discharge: HOME | End: 2020-02-02
Attending: UROLOGY
Payer: MEDICAID

## 2020-02-02 DIAGNOSIS — N20.0 RENAL STONE: ICD-10-CM

## 2020-02-02 PROCEDURE — 74176 CT ABD & PELVIS W/O CONTRAST: CPT | Mod: TC

## 2020-02-19 ENCOUNTER — OFFICE VISIT (OUTPATIENT)
Dept: UROLOGY | Facility: CLINIC | Age: 25
End: 2020-02-19
Payer: MEDICAID

## 2020-02-19 VITALS
SYSTOLIC BLOOD PRESSURE: 120 MMHG | WEIGHT: 230.94 LBS | DIASTOLIC BLOOD PRESSURE: 68 MMHG | BODY MASS INDEX: 38.43 KG/M2

## 2020-02-19 DIAGNOSIS — N20.0 RENAL STONE: Primary | ICD-10-CM

## 2020-02-19 LAB
BILIRUB SERPL-MCNC: NORMAL MG/DL
BLOOD URINE, POC: 50
COLOR, POC UA: YELLOW
GLUCOSE UR QL STRIP: NORMAL
KETONES UR QL STRIP: NORMAL
LEUKOCYTE ESTERASE URINE, POC: NORMAL
NITRITE, POC UA: NORMAL
PH, POC UA: 6
PROTEIN, POC: NORMAL
SPECIFIC GRAVITY, POC UA: 1.01
UROBILINOGEN, POC UA: NORMAL

## 2020-02-19 PROCEDURE — 99213 OFFICE O/P EST LOW 20 MIN: CPT | Mod: PBBFAC | Performed by: UROLOGY

## 2020-02-19 PROCEDURE — 99024 POSTOP FOLLOW-UP VISIT: CPT | Mod: ,,, | Performed by: UROLOGY

## 2020-02-19 PROCEDURE — 99999 PR PBB SHADOW E&M-EST. PATIENT-LVL III: CPT | Mod: PBBFAC,,, | Performed by: UROLOGY

## 2020-02-19 PROCEDURE — 99999 PR PBB SHADOW E&M-EST. PATIENT-LVL III: ICD-10-PCS | Mod: PBBFAC,,, | Performed by: UROLOGY

## 2020-02-19 PROCEDURE — 99024 PR POST-OP FOLLOW-UP VISIT: ICD-10-PCS | Mod: ,,, | Performed by: UROLOGY

## 2020-02-19 PROCEDURE — 81002 URINALYSIS NONAUTO W/O SCOPE: CPT | Mod: PBBFAC | Performed by: UROLOGY

## 2020-07-31 ENCOUNTER — TELEPHONE (OUTPATIENT)
Dept: OBSTETRICS AND GYNECOLOGY | Facility: CLINIC | Age: 25
End: 2020-07-31

## 2020-07-31 NOTE — TELEPHONE ENCOUNTER
----- Message from Wandy Aguilar sent at 7/31/2020  3:03 PM CDT -----  Pt would like to reschedule appt. Please call back at [phone]. Md Tisha

## 2020-08-06 ENCOUNTER — HOSPITAL ENCOUNTER (OUTPATIENT)
Facility: HOSPITAL | Age: 25
Discharge: HOME | End: 2020-08-06
Attending: OBSTETRICS & GYNECOLOGY | Admitting: OBSTETRICS & GYNECOLOGY
Payer: MEDICAID

## 2020-08-06 VITALS
DIASTOLIC BLOOD PRESSURE: 56 MMHG | SYSTOLIC BLOOD PRESSURE: 109 MMHG | BODY MASS INDEX: 36.88 KG/M2 | HEIGHT: 67 IN | HEART RATE: 85 BPM | TEMPERATURE: 98 F | RESPIRATION RATE: 18 BRPM | WEIGHT: 235 LBS | OXYGEN SATURATION: 97 %
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
## 2020-08-06 DIAGNOSIS — Z36.89 ENCOUNTER FOR FETAL ANATOMIC SURVEY: Primary | ICD-10-CM

## 2020-08-06 LAB
AMPHET+METHAMPHET UR QL: NORMAL
BARBITURATES UR QL SCN>200 NG/ML: NEGATIVE
BENZODIAZ UR QL SCN>200 NG/ML: NORMAL
BZE UR QL SCN: NORMAL
CANNABINOIDS UR QL SCN: NORMAL
CREAT UR-MCNC: 161.5 MG/DL (ref 15–325)
METHADONE UR QL SCN>300 NG/ML: NEGATIVE
OPIATES UR QL SCN: NORMAL
PCP UR QL SCN>25 NG/ML: NEGATIVE
TOXICOLOGY INFORMATION: NORMAL

## 2020-08-06 PROCEDURE — 99203 PR OFFICE/OUTPT VISIT, NEW, LEVL III, 30-44 MIN: ICD-10-PCS | Mod: TH,S$PBB,, | Performed by: MIDWIFE

## 2020-08-06 PROCEDURE — 80307 DRUG TEST PRSMV CHEM ANLYZR: CPT

## 2020-08-06 PROCEDURE — 99201 *HC E&M-NEW PATIENT-LVL I: CPT

## 2020-08-06 PROCEDURE — 99203 OFFICE O/P NEW LOW 30 MIN: CPT | Mod: TH,S$PBB,, | Performed by: MIDWIFE

## 2020-08-06 RX ORDER — ONDANSETRON 8 MG/1
8 TABLET, ORALLY DISINTEGRATING ORAL EVERY 8 HOURS PRN
Status: DISCONTINUED | OUTPATIENT
Start: 2020-08-06 | End: 2020-08-06 | Stop reason: HOSPADM

## 2020-08-06 RX ORDER — ACETAMINOPHEN 500 MG
500 TABLET ORAL EVERY 6 HOURS PRN
Status: DISCONTINUED | OUTPATIENT
Start: 2020-08-06 | End: 2020-08-06 | Stop reason: HOSPADM

## 2020-08-06 NOTE — DISCHARGE SUMMARY
[facility]  Obstetrics  Discharge Summary      Patient Name: Neha Vázquez  MRN: [id number]  Admission Date: 2020  Hospital Length of Stay: 0 days  Discharge Date and Time:  2020 10:25 AM  Attending Physician: Amy Short MD   Discharging Provider: Abigail Palacio CNM   Primary Care Provider: To Obtain Unable    HPI: 24 y.o.  JHONNY 20 EGA 19w5d c/o no fetal movement since her fall yesterday. Pt states she was receiving care from [facility] where she had an US performed, but was dismissed from their care. She has an appointment with [facility] scheduled tomorrow.     * No surgery found *     Hospital Course:   Doppler  bpm  Denies bleeding or cramping  Pt admits to IV drug use of heroin and use of meth, uds collected  Discharge home with early pregnancy warning signs. Resources for drug rehab given. Pt also working with adoption agency at this time as she plans for adoption after delivery. Keep apt tomorrow with MILTON Benito CNM.          Final Active Diagnoses:    Diagnosis Date Noted POA    PRINCIPAL PROBLEM:  Fall [W19.XXXA] 2020 Yes      Problems Resolved During this Admission:        Significant Diagnostic Studies: Labs: All labs within the past 24 hours have been reviewed        Immunizations     None          This patient has no babies on file.  Pending Diagnostic Studies:     Procedure Component Value Units Date/Time    Drug screen panel, emergency [[id number]] Collected: 20 0945    Order Status: Sent Lab Status: In process Updated: 20 1011    Specimen: Urine, Clean Catch           Discharged Condition: good    Disposition: Home or Self Care    Follow Up:  Follow-up Information     Migdalia Benito CNM On 2020.    Specialty: Obstetrics and Gynecology  Contact information:  [address] [facility] [address] [phone]                 Patient Instructions:      Diet Adult Regular     Notify your health care provider if you  experience any of the following:  temperature >100.4     Notify your health care provider if you experience any of the following:  persistent nausea and vomiting or diarrhea     Notify your health care provider if you experience any of the following:  severe uncontrolled pain     Notify your health care provider if you experience any of the following:  difficulty breathing or increased cough     Notify your health care provider if you experience any of the following:  severe persistent headache     Notify your health care provider if you experience any of the following:  persistent dizziness, light-headedness, or visual disturbances     Notify your health care provider if you experience any of the following:  increased confusion or weakness     Notify your health care provider if you experience any of the following:   Order Comments: Vaginal bleeding, leaking of fluid     Activity as tolerated     Medications:  Current Discharge Medication List      STOP taking these medications       naltrexone (DEPADE) 50 mg tablet Comments:   Reason for Stopping:         naltrexone microspheres (VIVITROL IM) Comments:   Reason for Stopping:               Abigail Palacio CNM  Obstetrics  [facility]

## 2020-08-06 NOTE — DISCHARGE INSTRUCTIONS

## 2020-08-06 NOTE — H&P
[facility]  Obstetrics  History & Physical    Patient Name: Neha Vázquez  MRN: [id number]  Admission Date: 2020  Primary Care Provider: To Obtain Unable    Subjective:     Principal Problem:Fall    History of Present Illness:  24 y.o.  JHONNY 20 EGA 19w5d c/o no fetal movement since her fall yesterday. Pt states she was receiving care from [facility] where she had an US performed, but was dismissed from their care. She has an appointment with [facility] scheduled tomorrow.     Obstetric HPI:  Patient reports None contractions, decreased  fetal movement, No vaginal bleeding , No loss of fluid     This pregnancy has been complicated by:  IV drug use, insufficient prenatal care, hep C +    OB History    Para Term  AB Living   3 1 1 0 1 1   SAB TAB Ectopic Multiple Live Births   0 1 0 0 0      # Outcome Date GA Lbr Wei/2nd Weight Sex Delivery Anes PTL Lv   3 Current            2 TAB            1 Term              Past Medical History:   Diagnosis Date    Blood clot associated with vein wall inflammation 2018    from PICC line    Endocarditis 2018    HCV (hepatitis C virus)     IV drug abuse      Past Surgical History:   Procedure Laterality Date    CYSTOSCOPY W/ URETERAL STENT PLACEMENT Right 2019    Procedure: CYSTOSCOPY, WITH URETERAL STENT INSERTION;  Surgeon: Mark Rosales IV, MD;  Location: [facility] OR;  Service: Urology;  Laterality: Right;    CYSTOSCOPY W/ URETERAL STENT PLACEMENT Right 2019    Procedure: CYSTOSCOPY, WITH URETERAL STENT INSERTION;  Surgeon: Mark Rosales IV, MD;  Location: [facility] OR;  Service: Urology;  Laterality: Right;    CYSTOSCOPY W/ URETERAL STENT REMOVAL Right 2019    Procedure: CYSTOSCOPY, WITH URETERAL STENT REMOVAL;  Surgeon: Mark Rosales IV, MD;  Location: [facility] OR;  Service: Urology;  Laterality: Right;    INCISION AND DRAINAGE Right     arm    INDUCED   2015    LASER LITHOTRIPSY Right 2019     "Procedure: LITHOTRIPSY, USING LASER;  Surgeon: Mark Rosales IV, MD;  Location: [facility] OR;  Service: Urology;  Laterality: Right;    RETROGRADE PYELOGRAPHY N/A 11/7/2019    Procedure: PYELOGRAM, RETROGRADE;  Surgeon: Mark Rosales IV, MD;  Location: [facility] OR;  Service: Urology;  Laterality: N/A;       PTA Medications   Medication Sig    naltrexone (DEPADE) 50 mg tablet Take 50 mg by mouth once daily.    naltrexone microspheres (VIVITROL IM) Inject into the muscle every 30 days.       Review of patient's allergies indicates:  No Known Allergies     Family History     Problem Relation (Age of Onset)    Heart disease Maternal Grandfather        Tobacco Use    Smoking status: Current Every Day Smoker    Smokeless tobacco: Never Used    Tobacco comment: no smoking after m.n prior to sx   Substance and Sexual Activity    Alcohol use: No    Drug use: Not Currently     Types: "Crack" cocaine, Methamphetamines     Comment: sober since 10/7/19    Sexual activity: Yes     Partners: Male     Review of Systems   Objective:     Vital Signs (Most Recent):  Temp: 98.2 °F (36.8 °C) (08/06/20 0945)  Pulse: 86 (08/06/20 0955)  Resp: 20 (08/06/20 0945)  BP: (!) 111/59 (08/06/20 0955)  SpO2: 96 % (08/06/20 0955) Vital Signs (24h Range):  Temp:  [98.2 °F (36.8 °C)] 98.2 °F (36.8 °C)  Pulse:  [] 86  Resp:  [20] 20  SpO2:  [96 %-98 %] 96 %  BP: (111-118)/(59-95) 111/59        There is no height or weight on file to calculate BMI.    FHT: 180 bpm    Physical Exam:   Constitutional: She is oriented to person, place, and time. She appears well-developed and well-nourished.    HENT:   Head: Normocephalic.    Eyes: Conjunctivae are normal.    Neck: Normal range of motion.     Pulmonary/Chest: Effort normal.        Abdominal: Soft.             Musculoskeletal: Normal range of motion.       Neurological: She is alert and oriented to person, place, and time.    Skin: Skin is warm and dry.    Psychiatric: She has a normal mood " and affect. Her behavior is normal. Judgment and thought content normal.       Cervix: deferred      Significant Labs:  Lab Results   Component Value Date    GROUPTRH O POS 2014       I have personallly reviewed all pertinent lab results from the last 24 hours.    Assessment/Plan:     24 y.o. female  at Unknown for:    * Fall  Doppler  bpm  Denies bleeding or cramping  Pt admits to IV drug use of heroin and use of meth, uds collected        Abigail Palacio CNM  Obstetrics  [facility]

## 2020-08-06 NOTE — ASSESSMENT & PLAN NOTE
Doppler  bpm  Denies bleeding or cramping  Pt admits to IV drug use of heroin and use of meth, uds collected

## 2020-08-06 NOTE — SUBJECTIVE & OBJECTIVE
Obstetric HPI:  Patient reports None contractions, decreased  fetal movement, No vaginal bleeding , No loss of fluid     This pregnancy has been complicated by:  IV drug use, insufficient prenatal care, hep C +    OB History    Para Term  AB Living   3 1 1 0 1 1   SAB TAB Ectopic Multiple Live Births   0 1 0 0 0      # Outcome Date GA Lbr Wei/2nd Weight Sex Delivery Anes PTL Lv   3 Current            2 TAB            1 Term              Past Medical History:   Diagnosis Date    Blood clot associated with vein wall inflammation 2018    from PICC line    Endocarditis 2018    HCV (hepatitis C virus)     IV drug abuse      Past Surgical History:   Procedure Laterality Date    CYSTOSCOPY W/ URETERAL STENT PLACEMENT Right 2019    Procedure: CYSTOSCOPY, WITH URETERAL STENT INSERTION;  Surgeon: Mark Rosales IV, MD;  Location: [facility] OR;  Service: Urology;  Laterality: Right;    CYSTOSCOPY W/ URETERAL STENT PLACEMENT Right 2019    Procedure: CYSTOSCOPY, WITH URETERAL STENT INSERTION;  Surgeon: Mark Rosales IV, MD;  Location: [facility] OR;  Service: Urology;  Laterality: Right;    CYSTOSCOPY W/ URETERAL STENT REMOVAL Right 2019    Procedure: CYSTOSCOPY, WITH URETERAL STENT REMOVAL;  Surgeon: Mark Rosales IV, MD;  Location: [facility] OR;  Service: Urology;  Laterality: Right;    INCISION AND DRAINAGE Right     arm    INDUCED       LASER LITHOTRIPSY Right 2019    Procedure: LITHOTRIPSY, USING LASER;  Surgeon: Mark Rosales IV, MD;  Location: [facility] OR;  Service: Urology;  Laterality: Right;    RETROGRADE PYELOGRAPHY N/A 2019    Procedure: PYELOGRAM, RETROGRADE;  Surgeon: Mark Rosales IV, MD;  Location: [facility] OR;  Service: Urology;  Laterality: N/A;       PTA Medications   Medication Sig    naltrexone (DEPADE) 50 mg tablet Take 50 mg by mouth once daily.    naltrexone microspheres (VIVITROL IM) Inject into the muscle every 30 days.       Review of patient's  "allergies indicates:  No Known Allergies     Family History     Problem Relation (Age of Onset)    Heart disease Maternal Grandfather        Tobacco Use    Smoking status: Current Every Day Smoker    Smokeless tobacco: Never Used    Tobacco comment: no smoking after m.n prior to sx   Substance and Sexual Activity    Alcohol use: No    Drug use: Not Currently     Types: "Crack" cocaine, Methamphetamines     Comment: sober since 10/7/19    Sexual activity: Yes     Partners: Male     Review of Systems   Objective:     Vital Signs (Most Recent):  Temp: 98.2 °F (36.8 °C) (08/06/20 0945)  Pulse: 86 (08/06/20 0955)  Resp: 20 (08/06/20 0945)  BP: (!) 111/59 (08/06/20 0955)  SpO2: 96 % (08/06/20 0955) Vital Signs (24h Range):  Temp:  [98.2 °F (36.8 °C)] 98.2 °F (36.8 °C)  Pulse:  [] 86  Resp:  [20] 20  SpO2:  [96 %-98 %] 96 %  BP: (111-118)/(59-95) 111/59        There is no height or weight on file to calculate BMI.    FHT: 180 bpm    Physical Exam:   Constitutional: She is oriented to person, place, and time. She appears well-developed and well-nourished.    HENT:   Head: Normocephalic.    Eyes: Conjunctivae are normal.    Neck: Normal range of motion.     Pulmonary/Chest: Effort normal.        Abdominal: Soft.             Musculoskeletal: Normal range of motion.       Neurological: She is alert and oriented to person, place, and time.    Skin: Skin is warm and dry.    Psychiatric: She has a normal mood and affect. Her behavior is normal. Judgment and thought content normal.       Cervix: deferred      Significant Labs:  Lab Results   Component Value Date    GROUPTRH O POS 08/24/2014       I have personallly reviewed all pertinent lab results from the last 24 hours.  "

## 2020-08-06 NOTE — HPI
24 y.o.  JHONNY 20 EGA 19w5d c/o no fetal movement since her fall yesterday. Pt states she was receiving care from [facility] where she had an US performed, but was dismissed from their care. She has an appointment with [facility] scheduled tomorrow.

## 2020-08-06 NOTE — HOSPITAL COURSE
Doppler  bpm  Denies bleeding or cramping  Pt admits to IV drug use of heroin and use of meth, uds collected  Discharge home with early pregnancy warning signs. Resources for drug rehab given. Pt also working with adoption agency at this time as she plans for adoption after delivery. Keep apt tomorrow with MILTON Benito CNM.

## 2020-08-07 ENCOUNTER — PROCEDURE VISIT (OUTPATIENT)
Dept: OBSTETRICS AND GYNECOLOGY | Facility: CLINIC | Age: 25
End: 2020-08-07
Payer: MEDICAID

## 2020-08-07 DIAGNOSIS — Z36.89 ENCOUNTER FOR FETAL ANATOMIC SURVEY: ICD-10-CM

## 2020-08-07 PROCEDURE — 76805 OB US >/= 14 WKS SNGL FETUS: CPT | Mod: 26,S$PBB,, | Performed by: OBSTETRICS & GYNECOLOGY

## 2020-08-07 PROCEDURE — 76805 PR US, OB 14+WKS, TRANSABD, SINGLE GESTATION: ICD-10-PCS | Mod: 26,S$PBB,, | Performed by: OBSTETRICS & GYNECOLOGY

## 2020-08-07 PROCEDURE — 76805 OB US >/= 14 WKS SNGL FETUS: CPT | Mod: PBBFAC | Performed by: OBSTETRICS & GYNECOLOGY

## 2020-08-11 ENCOUNTER — TELEPHONE (OUTPATIENT)
Dept: RADIOLOGY | Facility: HOSPITAL | Age: 25
End: 2020-08-11

## 2020-08-12 ENCOUNTER — DOCUMENTATION ONLY (OUTPATIENT)
Dept: UROLOGY | Facility: CLINIC | Age: 25
End: 2020-08-12

## 2020-08-12 ENCOUNTER — HOSPITAL ENCOUNTER (OUTPATIENT)
Dept: RADIOLOGY | Facility: HOSPITAL | Age: 25
Discharge: HOME | End: 2020-08-12
Attending: UROLOGY
Payer: MEDICAID

## 2020-08-12 DIAGNOSIS — N20.0 RENAL STONE: ICD-10-CM

## 2020-08-12 NOTE — PROGRESS NOTES
[facility] called and stated that they cannot do the x-ray on pt due to her being pregnant.

## 2020-08-19 ENCOUNTER — LAB VISIT (OUTPATIENT)
Dept: LAB | Facility: HOSPITAL | Age: 25
End: 2020-08-19
Attending: MIDWIFE
Payer: MEDICAID

## 2020-08-19 ENCOUNTER — PROCEDURE VISIT (OUTPATIENT)
Dept: OBSTETRICS AND GYNECOLOGY | Facility: CLINIC | Age: 25
End: 2020-08-19
Payer: MEDICAID

## 2020-08-19 ENCOUNTER — INITIAL PRENATAL (OUTPATIENT)
Dept: OBSTETRICS AND GYNECOLOGY | Facility: CLINIC | Age: 25
End: 2020-08-19
Payer: MEDICAID

## 2020-08-19 ENCOUNTER — DOCUMENTATION ONLY (OUTPATIENT)
Dept: OBSTETRICS AND GYNECOLOGY | Facility: CLINIC | Age: 25
End: 2020-08-19

## 2020-08-19 VITALS — SYSTOLIC BLOOD PRESSURE: 118 MMHG | WEIGHT: 203.69 LBS | BODY MASS INDEX: 31.9 KG/M2 | DIASTOLIC BLOOD PRESSURE: 58 MMHG

## 2020-08-19 DIAGNOSIS — O09.32 LIMITED PRENATAL CARE IN SECOND TRIMESTER: ICD-10-CM

## 2020-08-19 DIAGNOSIS — O09.92 HIGH-RISK PREGNANCY IN SECOND TRIMESTER: ICD-10-CM

## 2020-08-19 DIAGNOSIS — F19.91 HISTORY OF DRUG USE: ICD-10-CM

## 2020-08-19 PROCEDURE — 76816 PR  US,PREGNANT UTERUS,F/U,TRANSABD APP: ICD-10-PCS | Mod: 26,S$PBB,, | Performed by: OBSTETRICS & GYNECOLOGY

## 2020-08-19 PROCEDURE — 99204 PR OFFICE/OUTPT VISIT, NEW, LEVL IV, 45-59 MIN: ICD-10-PCS | Mod: S$PBB,TH,, | Performed by: MIDWIFE

## 2020-08-19 PROCEDURE — 99212 OFFICE O/P EST SF 10 MIN: CPT | Mod: PBBFAC,25,TH | Performed by: MIDWIFE

## 2020-08-19 PROCEDURE — 87491 CHLMYD TRACH DNA AMP PROBE: CPT

## 2020-08-19 PROCEDURE — 87086 URINE CULTURE/COLONY COUNT: CPT

## 2020-08-19 PROCEDURE — 99999 PR PBB SHADOW E&M-EST. PATIENT-LVL II: ICD-10-PCS | Mod: PBBFAC,,, | Performed by: MIDWIFE

## 2020-08-19 PROCEDURE — 99999 PR PBB SHADOW E&M-EST. PATIENT-LVL II: CPT | Mod: PBBFAC,,, | Performed by: MIDWIFE

## 2020-08-19 PROCEDURE — 80074 ACUTE HEPATITIS PANEL: CPT

## 2020-08-19 PROCEDURE — 86850 RBC ANTIBODY SCREEN: CPT

## 2020-08-19 PROCEDURE — 86703 HIV-1/HIV-2 1 RESULT ANTBDY: CPT

## 2020-08-19 PROCEDURE — 76816 OB US FOLLOW-UP PER FETUS: CPT | Mod: 26,S$PBB,, | Performed by: OBSTETRICS & GYNECOLOGY

## 2020-08-19 PROCEDURE — 80307 DRUG TEST PRSMV CHEM ANLYZR: CPT

## 2020-08-19 PROCEDURE — 88175 CYTOPATH C/V AUTO FLUID REDO: CPT

## 2020-08-19 PROCEDURE — 86592 SYPHILIS TEST NON-TREP QUAL: CPT

## 2020-08-19 PROCEDURE — 86762 RUBELLA ANTIBODY: CPT

## 2020-08-19 PROCEDURE — 85025 COMPLETE CBC W/AUTO DIFF WBC: CPT

## 2020-08-19 PROCEDURE — 36415 COLL VENOUS BLD VENIPUNCTURE: CPT

## 2020-08-19 PROCEDURE — 99204 OFFICE O/P NEW MOD 45 MIN: CPT | Mod: S$PBB,TH,, | Performed by: MIDWIFE

## 2020-08-19 PROCEDURE — 76816 OB US FOLLOW-UP PER FETUS: CPT | Mod: PBBFAC | Performed by: OBSTETRICS & GYNECOLOGY

## 2020-08-19 PROCEDURE — 83020 HEMOGLOBIN ELECTROPHORESIS: CPT

## 2020-08-19 NOTE — PROGRESS NOTES
8/7/20: patient left after U/S without being seen. Patient called by staff to return, but she states she can't come for appointment. Rescheduled.    U/S reviewed:  GA by U/S: 19 w 4d, assigned JHONNY 12/28/20, will confirm when records are received.     Spine views are suboptimal secondary to fetal position, no obvious abnormalities. Normal fetal growth, amniotic fluid, placental location, and cervical length. Will need f/u scan for sub optimal views.

## 2020-08-19 NOTE — LETTER
[facility]  [address] [place] [address]  Phone: [phone]  Fax: [phone]       To whom it may concern,    Neha Vázquez is under my care for her current pregnancy with an estimated due date of 12/26/2020. At this time, her pregnancy is progressing as expected and she is cleared for the rehabilitation process. If any complications arise, such as vaginal bleeding, leaking of fluid, or contractions please transfer Ms. Vázquez to the nearest hospital. Please let me know if you have any questions or concerns.    Respectfully,  Migdalia Benito CNM    August 19, 2020

## 2020-08-20 LAB
ABO + RH BLD: NORMAL
AMPHET+METHAMPHET UR QL: NORMAL
BARBITURATES UR QL SCN>200 NG/ML: NEGATIVE
BASOPHILS # BLD AUTO: 0.05 K/UL (ref 0–0.2)
BASOPHILS NFR BLD: 0.4 % (ref 0–1.9)
BENZODIAZ UR QL SCN>200 NG/ML: NEGATIVE
BLD GP AB SCN CELLS X3 SERPL QL: NORMAL
BZE UR QL SCN: NORMAL
CANNABINOIDS UR QL SCN: NORMAL
CREAT UR-MCNC: 227 MG/DL (ref 15–325)
DIFFERENTIAL METHOD: ABNORMAL
EOSINOPHIL # BLD AUTO: 0.2 K/UL (ref 0–0.5)
EOSINOPHIL NFR BLD: 1.5 % (ref 0–8)
ERYTHROCYTE [DISTWIDTH] IN BLOOD BY AUTOMATED COUNT: 14.5 % (ref 11.5–14.5)
HCT VFR BLD AUTO: 39.1 % (ref 37–48.5)
HGB BLD-MCNC: 12.5 G/DL (ref 12–16)
IMM GRANULOCYTES # BLD AUTO: 0.19 K/UL (ref 0–0.04)
IMM GRANULOCYTES NFR BLD AUTO: 1.6 % (ref 0–0.5)
LYMPHOCYTES # BLD AUTO: 2.3 K/UL (ref 1–4.8)
LYMPHOCYTES NFR BLD: 19.9 % (ref 18–48)
MCH RBC QN AUTO: 25.9 PG (ref 27–31)
MCHC RBC AUTO-ENTMCNC: 32 G/DL (ref 32–36)
MCV RBC AUTO: 81 FL (ref 82–98)
METHADONE UR QL SCN>300 NG/ML: NEGATIVE
MONOCYTES # BLD AUTO: 1 K/UL (ref 0.3–1)
MONOCYTES NFR BLD: 8.5 % (ref 4–15)
NEUTROPHILS # BLD AUTO: 7.9 K/UL (ref 1.8–7.7)
NEUTROPHILS NFR BLD: 68.1 % (ref 38–73)
NRBC BLD-RTO: 0 /100 WBC
OPIATES UR QL SCN: NORMAL
PCP UR QL SCN>25 NG/ML: NEGATIVE
PLATELET # BLD AUTO: 322 K/UL (ref 150–350)
PMV BLD AUTO: 9.6 FL (ref 9.2–12.9)
RBC # BLD AUTO: 4.83 M/UL (ref 4–5.4)
TOXICOLOGY INFORMATION: NORMAL
WBC # BLD AUTO: 11.59 K/UL (ref 3.9–12.7)

## 2020-08-21 LAB
BACTERIA UR CULT: NORMAL
BACTERIA UR CULT: NORMAL
HAV IGM SERPL QL IA: POSITIVE
HBV CORE IGM SERPL QL IA: NEGATIVE
HBV SURFACE AG SERPL QL IA: NEGATIVE
HCV AB SERPL QL IA: POSITIVE
HGB A2 MFR BLD HPLC: 3 % (ref 2.2–3.2)
HGB FRACT BLD ELPH-IMP: NORMAL
HGB FRACT BLD ELPH-IMP: NORMAL
HIV 1+2 AB+HIV1 P24 AG SERPL QL IA: NEGATIVE
RPR SER QL: NORMAL
RUBV IGG SER-ACNC: 20.8 IU/ML
RUBV IGG SER-IMP: REACTIVE

## 2020-08-23 PROBLEM — B15.9 VIRAL HEPATITIS A WITHOUT HEPATIC COMA: Status: ACTIVE | Noted: 2020-08-23

## 2020-08-23 PROBLEM — B19.20 HEPATITIS C: Status: ACTIVE | Noted: 2020-08-23

## 2020-08-27 ENCOUNTER — TELEPHONE (OUTPATIENT)
Dept: OBSTETRICS AND GYNECOLOGY | Facility: CLINIC | Age: 25
End: 2020-08-27

## 2020-08-27 PROBLEM — O09.32 LIMITED PRENATAL CARE IN SECOND TRIMESTER: Status: ACTIVE | Noted: 2020-08-27

## 2020-08-27 NOTE — TELEPHONE ENCOUNTER
----- Message from Rafaela Bliss sent at 8/20/2020  2:32 PM CDT -----  Regarding: Fax  Pt is calling in regards to giving provider name and fax number of program      [facility]  [phone]  ATTN:NATHALIA Cuevas call back at [phone]

## 2020-08-27 NOTE — PROGRESS NOTES
"  Patient here requesting release for rehab due to multiple substance drug use  States will be going to [facility] rehab center in [place], [place], everything is arranged and will go tomorrow  Patient expresses strong desire to stop drug use, and is aware of risks to fetus. Has been to rehab before without success. States, "This is the first time I want to stop, and am not going for other people."  Interested in placing baby up for adoption. Reviewed options, and encouraged to contact adoption agency when out of rehab.  FOB is somewhat involved. He is a drug user as well. Denies domestic abuse. States she has a 5 year old daughter, which her mother has custody of.   Had 1x U/S at , but states no other care received this pregnancy. Dating U/S in media. JHONNY 12/26/20.  Physical exam, PAP, Gen probes, and New Ob labs today.  U/S done: sub optimal views of sag spine remain due to fetal position. Otherwise reassuring scan. Cervical length, fetal growth, and amniotic fluid WNL. BOY "Butch Dennis"  S=D  Warning signs & when to seek medical attention reviewed  Letter given for release into rehabilition center  RTC in 5 weeks with f/u ultrasound, or PRN        "

## 2020-09-02 LAB
FINAL PATHOLOGIC DIAGNOSIS: NORMAL
Lab: NORMAL

## 2020-09-12 LAB
C TRACH DNA SPEC QL NAA+PROBE: NOT DETECTED
N GONORRHOEA DNA SPEC QL NAA+PROBE: NOT DETECTED

## 2020-09-23 ENCOUNTER — PROCEDURE VISIT (OUTPATIENT)
Dept: OBSTETRICS AND GYNECOLOGY | Facility: CLINIC | Age: 25
End: 2020-09-23
Payer: MEDICAID

## 2020-09-23 ENCOUNTER — ROUTINE PRENATAL (OUTPATIENT)
Dept: OBSTETRICS AND GYNECOLOGY | Facility: CLINIC | Age: 25
End: 2020-09-23
Payer: MEDICAID

## 2020-09-23 VITALS — SYSTOLIC BLOOD PRESSURE: 90 MMHG | DIASTOLIC BLOOD PRESSURE: 62 MMHG | BODY MASS INDEX: 32.15 KG/M2 | WEIGHT: 205.25 LBS

## 2020-09-23 DIAGNOSIS — B17.10 ACUTE HEPATITIS C VIRUS INFECTION WITHOUT HEPATIC COMA: Primary | ICD-10-CM

## 2020-09-23 DIAGNOSIS — Z34.92 NORMAL PREGNANCY IN SECOND TRIMESTER: ICD-10-CM

## 2020-09-23 DIAGNOSIS — B15.9 VIRAL HEPATITIS A WITHOUT HEPATIC COMA: ICD-10-CM

## 2020-09-23 PROCEDURE — 99213 OFFICE O/P EST LOW 20 MIN: CPT | Mod: TH,S$PBB,, | Performed by: MIDWIFE

## 2020-09-23 PROCEDURE — 90686 IIV4 VACC NO PRSV 0.5 ML IM: CPT | Mod: PBBFAC,SL

## 2020-09-23 PROCEDURE — 99213 PR OFFICE/OUTPT VISIT, EST, LEVL III, 20-29 MIN: ICD-10-PCS | Mod: TH,S$PBB,, | Performed by: MIDWIFE

## 2020-09-23 PROCEDURE — 99213 OFFICE O/P EST LOW 20 MIN: CPT | Mod: PBBFAC,25 | Performed by: MIDWIFE

## 2020-09-23 PROCEDURE — 99999 PR PBB SHADOW E&M-EST. PATIENT-LVL III: ICD-10-PCS | Mod: PBBFAC,,, | Performed by: MIDWIFE

## 2020-09-23 PROCEDURE — 99999 PR PBB SHADOW E&M-EST. PATIENT-LVL III: CPT | Mod: PBBFAC,,, | Performed by: MIDWIFE

## 2020-09-23 PROCEDURE — 76816 OB US FOLLOW-UP PER FETUS: CPT | Mod: 26,S$PBB,, | Performed by: OBSTETRICS & GYNECOLOGY

## 2020-09-23 PROCEDURE — 76816 PR  US,PREGNANT UTERUS,F/U,TRANSABD APP: ICD-10-PCS | Mod: 26,S$PBB,, | Performed by: OBSTETRICS & GYNECOLOGY

## 2020-09-23 PROCEDURE — 76816 OB US FOLLOW-UP PER FETUS: CPT | Mod: PBBFAC | Performed by: OBSTETRICS & GYNECOLOGY

## 2020-09-23 NOTE — NURSING
Used two patient identifiers. Verified allergies. Influenza  IM injection given to left deltoidl; patient tolerated well. Asked patient to remain in clinic for 15 minutes to be monitored for adverse reaction. Patient verbalized understanding.

## 2020-09-24 NOTE — PROGRESS NOTES
Doing great! Went to rehab for 3 days to detox, and finished detoxing at her dad's house. States she is no longer using any drugs. Feeling great!  States she is embracing family support, and now plans to keep the baby.  Reports good fetal movement, denies ctx, vb, lof  Desires circumcision for baby boy  Would like to try breastfeeding, states want to formula feed if baby does not latch well. Aware of benefits of BF to mom and baby.  Hepatology referral placed for + Hep A and + Hep C, patient aware liver clinic will be contacting her  Discussed flu shot recommendations, wants and done today  U/S today for f/u sub optimal views. Anatomy WNL and complete. Growth 51%tile. Normal MAYRA  S=D  Rx for prenatals sent per patient request  Reviewed normal fetal movement and warning signs in pregnancy  RTC in 2 weeks with 3T labs

## 2020-10-01 ENCOUNTER — DOCUMENTATION ONLY (OUTPATIENT)
Dept: TRANSPLANT | Facility: CLINIC | Age: 25
End: 2020-10-01

## 2020-10-01 NOTE — LETTER
October 1, 2020    Neha Vázquez  [address]  [facility] [place] [address]      Dear Neha Vázquez:    Your doctor has referred you to the [facility]. We are sending this letter to remind you to make an appointment with us to complete the referral process.     Please call us at [phone] to schedule an appointment. We look forward to seeing you soon.     If you received a call and have been scheduled, please disregard this letter.       Sincerely,        [facility]   [address] [place] [id number]  [phone]   neck pain/FACIAL PAIN/PRESSURE

## 2020-10-01 NOTE — NURSING
Pt records reviewed.   Pt will be referred to Hepatology.    Initial referral received  from the [facility]que.   Referring Provider/diagnosis    Migdalia Benito, ANTONIO     HEP A and HEP C and pregnant  Referral letter sent to patient.

## 2020-10-08 ENCOUNTER — TELEPHONE (OUTPATIENT)
Dept: GASTROENTEROLOGY | Facility: CLINIC | Age: 25
End: 2020-10-08

## 2020-10-08 ENCOUNTER — LAB VISIT (OUTPATIENT)
Dept: LAB | Facility: HOSPITAL | Age: 25
End: 2020-10-08
Attending: MIDWIFE
Payer: MEDICAID

## 2020-10-08 DIAGNOSIS — Z34.92 NORMAL PREGNANCY IN SECOND TRIMESTER: ICD-10-CM

## 2020-10-08 LAB
BASOPHILS # BLD AUTO: 0.06 K/UL (ref 0–0.2)
BASOPHILS NFR BLD: 0.5 % (ref 0–1.9)
DIFFERENTIAL METHOD: ABNORMAL
EOSINOPHIL # BLD AUTO: 0.2 K/UL (ref 0–0.5)
EOSINOPHIL NFR BLD: 1.3 % (ref 0–8)
ERYTHROCYTE [DISTWIDTH] IN BLOOD BY AUTOMATED COUNT: 15.6 % (ref 11.5–14.5)
HCT VFR BLD AUTO: 38.9 % (ref 37–48.5)
HGB BLD-MCNC: 12.3 G/DL (ref 12–16)
IMM GRANULOCYTES # BLD AUTO: 0.21 K/UL (ref 0–0.04)
IMM GRANULOCYTES NFR BLD AUTO: 1.7 % (ref 0–0.5)
LYMPHOCYTES # BLD AUTO: 1.7 K/UL (ref 1–4.8)
LYMPHOCYTES NFR BLD: 13.8 % (ref 18–48)
MCH RBC QN AUTO: 25.4 PG (ref 27–31)
MCHC RBC AUTO-ENTMCNC: 31.6 G/DL (ref 32–36)
MCV RBC AUTO: 80 FL (ref 82–98)
MONOCYTES # BLD AUTO: 0.9 K/UL (ref 0.3–1)
MONOCYTES NFR BLD: 7 % (ref 4–15)
NEUTROPHILS # BLD AUTO: 9.2 K/UL (ref 1.8–7.7)
NEUTROPHILS NFR BLD: 75.7 % (ref 38–73)
NRBC BLD-RTO: 0 /100 WBC
PLATELET # BLD AUTO: 301 K/UL (ref 150–350)
PMV BLD AUTO: 9.4 FL (ref 9.2–12.9)
RBC # BLD AUTO: 4.84 M/UL (ref 4–5.4)
WBC # BLD AUTO: 12.2 K/UL (ref 3.9–12.7)

## 2020-10-08 PROCEDURE — 86703 HIV-1/HIV-2 1 RESULT ANTBDY: CPT

## 2020-10-08 PROCEDURE — 82950 GLUCOSE TEST: CPT

## 2020-10-08 PROCEDURE — 86592 SYPHILIS TEST NON-TREP QUAL: CPT

## 2020-10-08 PROCEDURE — 85025 COMPLETE CBC W/AUTO DIFF WBC: CPT

## 2020-10-08 PROCEDURE — 36415 COLL VENOUS BLD VENIPUNCTURE: CPT

## 2020-10-08 NOTE — TELEPHONE ENCOUNTER
Attempted to contact patient at [phone] with no answer. Was not transferred to a voicemail option.

## 2020-10-08 NOTE — TELEPHONE ENCOUNTER
----- Message from Carla Gruber sent at 10/1/2020  3:22 PM CDT -----    ----- Message -----  From: Elle Stiles  Sent: 10/1/2020  11:36 AM CDT  To: Hepatology Scheduling    Pt records reviewed.   Pt will be referred to Hepatology.    Initial referral received  from the [facility].   Referring Provider/diagnosis    Migdalia Benito CNM     HEP A and HEP C and pregnant.      Please schedule with MD.        Referral letter sent to patient.

## 2020-10-09 LAB
GLUCOSE SERPL-MCNC: 99 MG/DL (ref 70–140)
RPR SER QL: NORMAL

## 2020-10-10 LAB — HIV 1+2 AB+HIV1 P24 AG SERPL QL IA: NEGATIVE

## 2020-10-13 ENCOUNTER — TELEPHONE (OUTPATIENT)
Dept: GASTROENTEROLOGY | Facility: CLINIC | Age: 25
End: 2020-10-13

## 2020-10-13 ENCOUNTER — TELEPHONE (OUTPATIENT)
Dept: OBSTETRICS AND GYNECOLOGY | Facility: CLINIC | Age: 25
End: 2020-10-13

## 2020-10-13 DIAGNOSIS — Z34.93 NORMAL PREGNANCY IN THIRD TRIMESTER: Primary | ICD-10-CM

## 2020-10-13 NOTE — TELEPHONE ENCOUNTER
"Attempted to contact patient at [phone] and received a "call rejected" response and line disconnected.   "

## 2020-10-13 NOTE — TELEPHONE ENCOUNTER
----- Message from Ning Abdullahi sent at 10/13/2020 12:21 PM CDT -----  Regarding: prenatal  Contact: lEen  Needs to change prenatal that will be covered by the insurance. [phone]

## 2020-10-13 NOTE — TELEPHONE ENCOUNTER
Returned call to [facility].  Spoke with Pharmacist, Radha.  She says that the prescription for the prenatal vitamins are not covered by patient's insurance.  Radha says that a recommended prenatal vitamin was not provided.  Will need a prescription covered by insurance sent, per Radha.  Made her aware that the message will be sent to the provider, she verbalized understanding.

## 2020-10-14 RX ORDER — PNV NO.95/FERROUS FUM/FOLIC AC 28MG-0.8MG
1 TABLET ORAL DAILY
Qty: 30 TABLET | Refills: 6 | Status: SHIPPED | OUTPATIENT
Start: 2020-10-14

## 2020-10-15 ENCOUNTER — TELEPHONE (OUTPATIENT)
Dept: GASTROENTEROLOGY | Facility: CLINIC | Age: 25
End: 2020-10-15

## 2020-10-15 NOTE — TELEPHONE ENCOUNTER
Unable to reach patient by phone to schedule an appointment. A letter has been mailed to patient home address on file.

## 2020-10-15 NOTE — LETTER
October 15, 2020    Neha Radha Manpreetadrián  [address]  [place] [address]             [facility]  [address] [place] [address]  Phone: [phone]  Fax: [phone] Dear Mrs. Vázquez:    We are writing to you because we have made multiple attempts to reach you by phone and have been unsuccessful.      Your physician has referred you to Hepatology clinic for evaluation and we would like to schedule your appointment.     Please call us at your earliest convenience.  [facility] [phone]    Sincerely,      [facility]

## 2020-10-26 ENCOUNTER — TELEPHONE (OUTPATIENT)
Dept: OBSTETRICS AND GYNECOLOGY | Facility: CLINIC | Age: 25
End: 2020-10-26

## 2020-10-26 NOTE — TELEPHONE ENCOUNTER
Patient called to reschedule appointment.  Patient unaware that we had clinic in [place].  Reschedule appointment.

## 2020-10-27 ENCOUNTER — ROUTINE PRENATAL (OUTPATIENT)
Dept: OBSTETRICS AND GYNECOLOGY | Facility: CLINIC | Age: 25
End: 2020-10-27
Payer: MEDICAID

## 2020-10-27 ENCOUNTER — LAB VISIT (OUTPATIENT)
Dept: LAB | Facility: HOSPITAL | Age: 25
End: 2020-10-27
Attending: ADVANCED PRACTICE MIDWIFE
Payer: MEDICAID

## 2020-10-27 VITALS
WEIGHT: 206.38 LBS | SYSTOLIC BLOOD PRESSURE: 100 MMHG | DIASTOLIC BLOOD PRESSURE: 62 MMHG | BODY MASS INDEX: 32.32 KG/M2

## 2020-10-27 DIAGNOSIS — B15.9 VIRAL HEPATITIS A WITHOUT HEPATIC COMA: ICD-10-CM

## 2020-10-27 DIAGNOSIS — O09.32 LIMITED PRENATAL CARE IN SECOND TRIMESTER: ICD-10-CM

## 2020-10-27 DIAGNOSIS — O09.32 LIMITED PRENATAL CARE IN SECOND TRIMESTER: Primary | ICD-10-CM

## 2020-10-27 LAB
ALBUMIN SERPL BCP-MCNC: 2.5 G/DL (ref 3.5–5.2)
ALP SERPL-CCNC: 121 U/L (ref 55–135)
ALT SERPL W/O P-5'-P-CCNC: 42 U/L (ref 10–44)
ANION GAP SERPL CALC-SCNC: 9 MMOL/L (ref 8–16)
AST SERPL-CCNC: 35 U/L (ref 10–40)
BILIRUB SERPL-MCNC: 0.3 MG/DL (ref 0.1–1)
BUN SERPL-MCNC: 6 MG/DL (ref 6–20)
CALCIUM SERPL-MCNC: 8.6 MG/DL (ref 8.7–10.5)
CHLORIDE SERPL-SCNC: 104 MMOL/L (ref 95–110)
CO2 SERPL-SCNC: 20 MMOL/L (ref 23–29)
CREAT SERPL-MCNC: 0.7 MG/DL (ref 0.5–1.4)
EST. GFR  (AFRICAN AMERICAN): >60 ML/MIN/1.73 M^2
EST. GFR  (NON AFRICAN AMERICAN): >60 ML/MIN/1.73 M^2
GLUCOSE SERPL-MCNC: 85 MG/DL (ref 70–110)
POTASSIUM SERPL-SCNC: 3.8 MMOL/L (ref 3.5–5.1)
PROT SERPL-MCNC: 7.2 G/DL (ref 6–8.4)
SODIUM SERPL-SCNC: 133 MMOL/L (ref 136–145)

## 2020-10-27 PROCEDURE — 86709 HEPATITIS A IGM ANTIBODY: CPT

## 2020-10-27 PROCEDURE — 99212 OFFICE O/P EST SF 10 MIN: CPT | Mod: PBBFAC,TH,PO | Performed by: ADVANCED PRACTICE MIDWIFE

## 2020-10-27 PROCEDURE — 86790 VIRUS ANTIBODY NOS: CPT

## 2020-10-27 PROCEDURE — 80053 COMPREHEN METABOLIC PANEL: CPT

## 2020-10-27 PROCEDURE — 99999 PR PBB SHADOW E&M-EST. PATIENT-LVL II: CPT | Mod: PBBFAC,,, | Performed by: ADVANCED PRACTICE MIDWIFE

## 2020-10-27 PROCEDURE — 99213 PR OFFICE/OUTPT VISIT, EST, LEVL III, 20-29 MIN: ICD-10-PCS | Mod: TH,S$PBB,, | Performed by: ADVANCED PRACTICE MIDWIFE

## 2020-10-27 PROCEDURE — 99999 PR PBB SHADOW E&M-EST. PATIENT-LVL II: ICD-10-PCS | Mod: PBBFAC,,, | Performed by: ADVANCED PRACTICE MIDWIFE

## 2020-10-27 PROCEDURE — 99213 OFFICE O/P EST LOW 20 MIN: CPT | Mod: TH,S$PBB,, | Performed by: ADVANCED PRACTICE MIDWIFE

## 2020-10-27 PROCEDURE — 36415 COLL VENOUS BLD VENIPUNCTURE: CPT | Mod: PO

## 2020-10-27 PROCEDURE — 90715 TDAP VACCINE 7 YRS/> IM: CPT | Mod: PBBFAC,PO | Performed by: ADVANCED PRACTICE MIDWIFE

## 2020-10-28 LAB
HAV IGM SERPL QL IA: ABNORMAL
HEPATITIS A ANTIBODY, IGG: POSITIVE

## 2020-11-02 NOTE — PROGRESS NOTES
"  24 y.o. female  at 31w3d   Reports + FM, denies VB, LOF or CTX  Doing well without concerns, "exhausted"  TW lbs   Reviewed 28wk lab results (O POS)  Tdap today  Hep/CMP labs today  Reviewed warning signs, normal FKCs,  labor precautions and how/when to call.  RTC x 2 wks, call or present sooner prn.         "

## 2020-11-10 ENCOUNTER — ROUTINE PRENATAL (OUTPATIENT)
Dept: OBSTETRICS AND GYNECOLOGY | Facility: CLINIC | Age: 25
End: 2020-11-10
Payer: MEDICAID

## 2020-11-10 VITALS
SYSTOLIC BLOOD PRESSURE: 120 MMHG | BODY MASS INDEX: 32.75 KG/M2 | DIASTOLIC BLOOD PRESSURE: 72 MMHG | WEIGHT: 209.13 LBS

## 2020-11-10 DIAGNOSIS — O09.32 LIMITED PRENATAL CARE IN SECOND TRIMESTER: ICD-10-CM

## 2020-11-10 DIAGNOSIS — B19.20 HEPATITIS C VIRUS INFECTION WITHOUT HEPATIC COMA, UNSPECIFIED CHRONICITY: ICD-10-CM

## 2020-11-10 DIAGNOSIS — B15.9 VIRAL HEPATITIS A WITHOUT HEPATIC COMA: ICD-10-CM

## 2020-11-10 DIAGNOSIS — F19.11 HISTORY OF SUBSTANCE ABUSE: ICD-10-CM

## 2020-11-10 DIAGNOSIS — O26.843 UTERINE SIZE DATE DISCREPANCY PREGNANCY, THIRD TRIMESTER: Primary | ICD-10-CM

## 2020-11-10 PROCEDURE — 99213 PR OFFICE/OUTPT VISIT, EST, LEVL III, 20-29 MIN: ICD-10-PCS | Mod: TH,S$PBB,, | Performed by: ADVANCED PRACTICE MIDWIFE

## 2020-11-10 PROCEDURE — 99212 OFFICE O/P EST SF 10 MIN: CPT | Mod: PBBFAC,TH,PO | Performed by: ADVANCED PRACTICE MIDWIFE

## 2020-11-10 PROCEDURE — 99999 PR PBB SHADOW E&M-EST. PATIENT-LVL II: ICD-10-PCS | Mod: PBBFAC,,, | Performed by: ADVANCED PRACTICE MIDWIFE

## 2020-11-10 PROCEDURE — 80307 DRUG TEST PRSMV CHEM ANLYZR: CPT

## 2020-11-10 PROCEDURE — 99999 PR PBB SHADOW E&M-EST. PATIENT-LVL II: CPT | Mod: PBBFAC,,, | Performed by: ADVANCED PRACTICE MIDWIFE

## 2020-11-10 PROCEDURE — 99213 OFFICE O/P EST LOW 20 MIN: CPT | Mod: TH,S$PBB,, | Performed by: ADVANCED PRACTICE MIDWIFE

## 2020-11-10 NOTE — PROGRESS NOTES
24 y.o. female  at 33w3d   Reports + FM, denies VB, LOF or CTX  Doing well without concerns  UDS today, pt aware, states still clean doing well!  TW lbs   US next OV S<D   Reviewed warning signs, normal FKCs,  labor precautions and how/when to call.  RTC x 2 wks, call or present sooner prn.          SURG

## 2020-11-11 LAB
AMPHET+METHAMPHET UR QL: NEGATIVE
BARBITURATES UR QL SCN>200 NG/ML: NEGATIVE
BENZODIAZ UR QL SCN>200 NG/ML: NEGATIVE
BZE UR QL SCN: NEGATIVE
CANNABINOIDS UR QL SCN: NEGATIVE
CREAT UR-MCNC: 155 MG/DL (ref 15–325)
METHADONE UR QL SCN>300 NG/ML: NEGATIVE
OPIATES UR QL SCN: NEGATIVE
PCP UR QL SCN>25 NG/ML: NEGATIVE
TOXICOLOGY INFORMATION: NORMAL

## 2020-11-24 ENCOUNTER — ROUTINE PRENATAL (OUTPATIENT)
Dept: OBSTETRICS AND GYNECOLOGY | Facility: CLINIC | Age: 25
End: 2020-11-24
Payer: MEDICAID

## 2020-11-24 ENCOUNTER — PROCEDURE VISIT (OUTPATIENT)
Dept: OBSTETRICS AND GYNECOLOGY | Facility: CLINIC | Age: 25
End: 2020-11-24
Payer: MEDICAID

## 2020-11-24 VITALS
SYSTOLIC BLOOD PRESSURE: 120 MMHG | BODY MASS INDEX: 33.98 KG/M2 | WEIGHT: 216.94 LBS | DIASTOLIC BLOOD PRESSURE: 78 MMHG

## 2020-11-24 DIAGNOSIS — B19.20 HEPATITIS C VIRUS INFECTION WITHOUT HEPATIC COMA, UNSPECIFIED CHRONICITY: ICD-10-CM

## 2020-11-24 DIAGNOSIS — O09.32 LIMITED PRENATAL CARE IN SECOND TRIMESTER: Primary | ICD-10-CM

## 2020-11-24 DIAGNOSIS — B15.9 VIRAL HEPATITIS A WITHOUT HEPATIC COMA: ICD-10-CM

## 2020-11-24 DIAGNOSIS — F19.11 HISTORY OF SUBSTANCE ABUSE: ICD-10-CM

## 2020-11-24 DIAGNOSIS — O26.843 UTERINE SIZE DATE DISCREPANCY PREGNANCY, THIRD TRIMESTER: ICD-10-CM

## 2020-11-24 PROCEDURE — 76816 OB US FOLLOW-UP PER FETUS: CPT | Mod: PBBFAC | Performed by: OBSTETRICS & GYNECOLOGY

## 2020-11-24 PROCEDURE — 76816 OB US FOLLOW-UP PER FETUS: CPT | Mod: 26,S$PBB,, | Performed by: OBSTETRICS & GYNECOLOGY

## 2020-11-24 PROCEDURE — 76819 PR US, OB, FETAL BIOPHYSICAL, W/O NST: ICD-10-PCS | Mod: 26,S$PBB,, | Performed by: OBSTETRICS & GYNECOLOGY

## 2020-11-24 PROCEDURE — 76816 PR  US,PREGNANT UTERUS,F/U,TRANSABD APP: ICD-10-PCS | Mod: 26,S$PBB,, | Performed by: OBSTETRICS & GYNECOLOGY

## 2020-11-24 PROCEDURE — 76819 FETAL BIOPHYS PROFIL W/O NST: CPT | Mod: PBBFAC | Performed by: OBSTETRICS & GYNECOLOGY

## 2020-11-24 PROCEDURE — 99213 PR OFFICE/OUTPT VISIT, EST, LEVL III, 20-29 MIN: ICD-10-PCS | Mod: TH,S$PBB,, | Performed by: ADVANCED PRACTICE MIDWIFE

## 2020-11-24 PROCEDURE — 99212 OFFICE O/P EST SF 10 MIN: CPT | Mod: PBBFAC,TH,25 | Performed by: ADVANCED PRACTICE MIDWIFE

## 2020-11-24 PROCEDURE — 76819 FETAL BIOPHYS PROFIL W/O NST: CPT | Mod: 26,S$PBB,, | Performed by: OBSTETRICS & GYNECOLOGY

## 2020-11-24 PROCEDURE — 99999 PR PBB SHADOW E&M-EST. PATIENT-LVL II: CPT | Mod: PBBFAC,,, | Performed by: ADVANCED PRACTICE MIDWIFE

## 2020-11-24 PROCEDURE — 99999 PR PBB SHADOW E&M-EST. PATIENT-LVL II: ICD-10-PCS | Mod: PBBFAC,,, | Performed by: ADVANCED PRACTICE MIDWIFE

## 2020-11-24 PROCEDURE — 99213 OFFICE O/P EST LOW 20 MIN: CPT | Mod: TH,S$PBB,, | Performed by: ADVANCED PRACTICE MIDWIFE

## 2020-11-24 NOTE — PROGRESS NOTES
Doing well, denies needs at this time  Brought her daughter to the clinic who had a hurt foot and she was going see what is going on with it at the ER today  Ultrasound today with cephalic presentation, anterior placenta, 3VC, MVP 7.1cm, MAYRA 12.7cm, BPP 8/8, EFW 31%, 5lb 12oz, reviewed with pt   GBS to be collected at nv when 36 weeks gestation   Labor precautions and fetal movement reviewed, when to go to hospital   Stressed hydration   Last UDS negative

## 2020-12-10 ENCOUNTER — ROUTINE PRENATAL (OUTPATIENT)
Dept: OBSTETRICS AND GYNECOLOGY | Facility: CLINIC | Age: 25
End: 2020-12-10
Payer: MEDICAID

## 2020-12-10 VITALS
SYSTOLIC BLOOD PRESSURE: 118 MMHG | WEIGHT: 210.88 LBS | BODY MASS INDEX: 33.03 KG/M2 | DIASTOLIC BLOOD PRESSURE: 70 MMHG

## 2020-12-10 DIAGNOSIS — F19.11 HISTORY OF SUBSTANCE ABUSE: ICD-10-CM

## 2020-12-10 DIAGNOSIS — O09.32 LIMITED PRENATAL CARE IN SECOND TRIMESTER: Primary | ICD-10-CM

## 2020-12-10 PROCEDURE — 99212 OFFICE O/P EST SF 10 MIN: CPT | Mod: PBBFAC,TH,PO | Performed by: ADVANCED PRACTICE MIDWIFE

## 2020-12-10 PROCEDURE — 87081 CULTURE SCREEN ONLY: CPT

## 2020-12-10 PROCEDURE — 99213 PR OFFICE/OUTPT VISIT, EST, LEVL III, 20-29 MIN: ICD-10-PCS | Mod: TH,S$PBB,, | Performed by: ADVANCED PRACTICE MIDWIFE

## 2020-12-10 PROCEDURE — 99999 PR PBB SHADOW E&M-EST. PATIENT-LVL II: CPT | Mod: PBBFAC,,, | Performed by: ADVANCED PRACTICE MIDWIFE

## 2020-12-10 PROCEDURE — 99999 PR PBB SHADOW E&M-EST. PATIENT-LVL II: ICD-10-PCS | Mod: PBBFAC,,, | Performed by: ADVANCED PRACTICE MIDWIFE

## 2020-12-10 PROCEDURE — 99213 OFFICE O/P EST LOW 20 MIN: CPT | Mod: TH,S$PBB,, | Performed by: ADVANCED PRACTICE MIDWIFE

## 2020-12-10 RX ORDER — TERCONAZOLE 4 MG/G
1 CREAM VAGINAL DAILY
Qty: 1 TUBE | Refills: 0 | Status: ON HOLD | OUTPATIENT
Start: 2020-12-10 | End: 2020-12-13 | Stop reason: HOSPADM

## 2020-12-12 ENCOUNTER — HOSPITAL ENCOUNTER (INPATIENT)
Facility: HOSPITAL | Age: 25
LOS: 1 days | Discharge: HOME | End: 2020-12-13
Attending: OBSTETRICS & GYNECOLOGY | Admitting: OBSTETRICS & GYNECOLOGY
Payer: MEDICAID

## 2020-12-12 DIAGNOSIS — Z37.9 NORMAL LABOR: ICD-10-CM

## 2020-12-12 LAB
ABO + RH BLD: NORMAL
AMPHET+METHAMPHET UR QL: NORMAL
BARBITURATES UR QL SCN>200 NG/ML: NEGATIVE
BASOPHILS # BLD AUTO: 0.06 K/UL (ref 0–0.2)
BASOPHILS NFR BLD: 0.5 % (ref 0–1.9)
BENZODIAZ UR QL SCN>200 NG/ML: NEGATIVE
BLD GP AB SCN CELLS X3 SERPL QL: NORMAL
BZE UR QL SCN: NORMAL
CANNABINOIDS UR QL SCN: NEGATIVE
CREAT UR-MCNC: 37 MG/DL (ref 15–325)
DIFFERENTIAL METHOD: ABNORMAL
EOSINOPHIL # BLD AUTO: 0.1 K/UL (ref 0–0.5)
EOSINOPHIL NFR BLD: 1.2 % (ref 0–8)
ERYTHROCYTE [DISTWIDTH] IN BLOOD BY AUTOMATED COUNT: 15 % (ref 11.5–14.5)
HCT VFR BLD AUTO: 39.9 % (ref 37–48.5)
HGB BLD-MCNC: 12.4 G/DL (ref 12–16)
IMM GRANULOCYTES # BLD AUTO: 0.14 K/UL (ref 0–0.04)
IMM GRANULOCYTES NFR BLD AUTO: 1.2 % (ref 0–0.5)
LYMPHOCYTES # BLD AUTO: 2.3 K/UL (ref 1–4.8)
LYMPHOCYTES NFR BLD: 19.6 % (ref 18–48)
MCH RBC QN AUTO: 24 PG (ref 27–31)
MCHC RBC AUTO-ENTMCNC: 31.1 G/DL (ref 32–36)
MCV RBC AUTO: 77 FL (ref 82–98)
METHADONE UR QL SCN>300 NG/ML: NEGATIVE
MONOCYTES # BLD AUTO: 0.9 K/UL (ref 0.3–1)
MONOCYTES NFR BLD: 7.7 % (ref 4–15)
NEUTROPHILS # BLD AUTO: 8.1 K/UL (ref 1.8–7.7)
NEUTROPHILS NFR BLD: 69.8 % (ref 38–73)
NRBC BLD-RTO: 0 /100 WBC
OPIATES UR QL SCN: NORMAL
PCP UR QL SCN>25 NG/ML: NEGATIVE
PLATELET # BLD AUTO: 342 K/UL (ref 150–350)
PMV BLD AUTO: 9.1 FL (ref 9.2–12.9)
RBC # BLD AUTO: 5.16 M/UL (ref 4–5.4)
SARS-COV-2 RDRP RESP QL NAA+PROBE: NEGATIVE
TOXICOLOGY INFORMATION: NORMAL
WBC # BLD AUTO: 11.55 K/UL (ref 3.9–12.7)

## 2020-12-12 PROCEDURE — 59409 OBSTETRICAL CARE: CPT | Mod: AT,,, | Performed by: MIDWIFE

## 2020-12-12 PROCEDURE — 25000003 PHARM REV CODE 250: Performed by: MIDWIFE

## 2020-12-12 PROCEDURE — 80307 DRUG TEST PRSMV CHEM ANLYZR: CPT

## 2020-12-12 PROCEDURE — 72100002 HC LABOR CARE, 1ST 8 HOURS

## 2020-12-12 PROCEDURE — 85025 COMPLETE CBC W/AUTO DIFF WBC: CPT

## 2020-12-12 PROCEDURE — U0002 COVID-19 LAB TEST NON-CDC: HCPCS

## 2020-12-12 PROCEDURE — 86901 BLOOD TYPING SEROLOGIC RH(D): CPT

## 2020-12-12 PROCEDURE — 11000001 HC ACUTE MED/SURG PRIVATE ROOM

## 2020-12-12 PROCEDURE — 59409 PR OBSTETRICAL CARE,VAG DELIV ONLY: ICD-10-PCS | Mod: AT,,, | Performed by: MIDWIFE

## 2020-12-12 PROCEDURE — 63600175 PHARM REV CODE 636 W HCPCS: Performed by: MIDWIFE

## 2020-12-12 PROCEDURE — 72200004 HC VAGINAL DELIVERY LEVEL I

## 2020-12-12 RX ORDER — DOCUSATE SODIUM 100 MG/1
200 CAPSULE, LIQUID FILLED ORAL 2 TIMES DAILY PRN
Status: DISCONTINUED | OUTPATIENT
Start: 2020-12-12 | End: 2020-12-13 | Stop reason: HOSPADM

## 2020-12-12 RX ORDER — MISOPROSTOL 200 UG/1
600 TABLET ORAL
Status: DISCONTINUED | OUTPATIENT
Start: 2020-12-12 | End: 2020-12-12

## 2020-12-12 RX ORDER — ACETAMINOPHEN 325 MG/1
650 TABLET ORAL EVERY 6 HOURS PRN
Status: DISCONTINUED | OUTPATIENT
Start: 2020-12-12 | End: 2020-12-13 | Stop reason: HOSPADM

## 2020-12-12 RX ORDER — PRENATAL WITH FERROUS FUM AND FOLIC ACID 3080; 920; 120; 400; 22; 1.84; 3; 20; 10; 1; 12; 200; 27; 25; 2 [IU]/1; [IU]/1; MG/1; [IU]/1; MG/1; MG/1; MG/1; MG/1; MG/1; MG/1; UG/1; MG/1; MG/1; MG/1; MG/1
1 TABLET ORAL DAILY
Status: DISCONTINUED | OUTPATIENT
Start: 2020-12-12 | End: 2020-12-13 | Stop reason: HOSPADM

## 2020-12-12 RX ORDER — OXYTOCIN/RINGER'S LACTATE 30/500 ML
333 PLASTIC BAG, INJECTION (ML) INTRAVENOUS CONTINUOUS
Status: DISCONTINUED | OUTPATIENT
Start: 2020-12-12 | End: 2020-12-12

## 2020-12-12 RX ORDER — ONDANSETRON 8 MG/1
8 TABLET, ORALLY DISINTEGRATING ORAL EVERY 8 HOURS PRN
Status: DISCONTINUED | OUTPATIENT
Start: 2020-12-12 | End: 2020-12-12

## 2020-12-12 RX ORDER — SIMETHICONE 80 MG
1 TABLET,CHEWABLE ORAL EVERY 6 HOURS PRN
Status: DISCONTINUED | OUTPATIENT
Start: 2020-12-12 | End: 2020-12-13 | Stop reason: HOSPADM

## 2020-12-12 RX ORDER — CALCIUM CARBONATE 200(500)MG
500 TABLET,CHEWABLE ORAL 3 TIMES DAILY PRN
Status: DISCONTINUED | OUTPATIENT
Start: 2020-12-12 | End: 2020-12-12

## 2020-12-12 RX ORDER — OXYTOCIN/RINGER'S LACTATE 30/500 ML
41.7 PLASTIC BAG, INJECTION (ML) INTRAVENOUS CONTINUOUS
Status: DISCONTINUED | OUTPATIENT
Start: 2020-12-12 | End: 2020-12-12

## 2020-12-12 RX ORDER — SODIUM CHLORIDE, SODIUM LACTATE, POTASSIUM CHLORIDE, CALCIUM CHLORIDE 600; 310; 30; 20 MG/100ML; MG/100ML; MG/100ML; MG/100ML
INJECTION, SOLUTION INTRAVENOUS CONTINUOUS
Status: DISCONTINUED | OUTPATIENT
Start: 2020-12-12 | End: 2020-12-12

## 2020-12-12 RX ORDER — IBUPROFEN 600 MG/1
600 TABLET ORAL EVERY 6 HOURS
Status: DISCONTINUED | OUTPATIENT
Start: 2020-12-12 | End: 2020-12-13 | Stop reason: HOSPADM

## 2020-12-12 RX ORDER — DIPHENHYDRAMINE HYDROCHLORIDE 50 MG/ML
25 INJECTION INTRAMUSCULAR; INTRAVENOUS EVERY 4 HOURS PRN
Status: DISCONTINUED | OUTPATIENT
Start: 2020-12-12 | End: 2020-12-13 | Stop reason: HOSPADM

## 2020-12-12 RX ORDER — OXYTOCIN/RINGER'S LACTATE 30/500 ML
95 PLASTIC BAG, INJECTION (ML) INTRAVENOUS ONCE
Status: DISCONTINUED | OUTPATIENT
Start: 2020-12-12 | End: 2020-12-13 | Stop reason: HOSPADM

## 2020-12-12 RX ORDER — DIPHENHYDRAMINE HCL 25 MG
25 CAPSULE ORAL EVERY 4 HOURS PRN
Status: DISCONTINUED | OUTPATIENT
Start: 2020-12-12 | End: 2020-12-13 | Stop reason: HOSPADM

## 2020-12-12 RX ORDER — HYDROCODONE BITARTRATE AND ACETAMINOPHEN 5; 325 MG/1; MG/1
1 TABLET ORAL EVERY 4 HOURS PRN
Status: DISCONTINUED | OUTPATIENT
Start: 2020-12-12 | End: 2020-12-13 | Stop reason: HOSPADM

## 2020-12-12 RX ORDER — SIMETHICONE 80 MG
1 TABLET,CHEWABLE ORAL 4 TIMES DAILY PRN
Status: DISCONTINUED | OUTPATIENT
Start: 2020-12-12 | End: 2020-12-12

## 2020-12-12 RX ORDER — SODIUM CHLORIDE 9 MG/ML
INJECTION, SOLUTION INTRAVENOUS
Status: DISCONTINUED | OUTPATIENT
Start: 2020-12-12 | End: 2020-12-12

## 2020-12-12 RX ORDER — ONDANSETRON 8 MG/1
8 TABLET, ORALLY DISINTEGRATING ORAL EVERY 8 HOURS PRN
Status: DISCONTINUED | OUTPATIENT
Start: 2020-12-12 | End: 2020-12-13 | Stop reason: HOSPADM

## 2020-12-12 RX ADMIN — SODIUM CHLORIDE, SODIUM LACTATE, POTASSIUM CHLORIDE, AND CALCIUM CHLORIDE: .6; .31; .03; .02 INJECTION, SOLUTION INTRAVENOUS at 07:12

## 2020-12-12 RX ADMIN — ACETAMINOPHEN 650 MG: 325 TABLET ORAL at 09:12

## 2020-12-12 RX ADMIN — IBUPROFEN 600 MG: 600 TABLET, FILM COATED ORAL at 04:12

## 2020-12-12 RX ADMIN — PRENATAL VITAMINS-IRON FUMARATE 27 MG IRON-FOLIC ACID 0.8 MG TABLET 1 TABLET: at 10:12

## 2020-12-12 RX ADMIN — SODIUM CHLORIDE, SODIUM LACTATE, POTASSIUM CHLORIDE, AND CALCIUM CHLORIDE 1000 ML: .6; .31; .03; .02 INJECTION, SOLUTION INTRAVENOUS at 07:12

## 2020-12-12 RX ADMIN — IBUPROFEN 600 MG: 600 TABLET, FILM COATED ORAL at 10:12

## 2020-12-12 NOTE — HOSPITAL COURSE
38w Labor  Admit  Epidural  Anticipate vaginal delivery  2020-PPD #1, normal PP course, baby being transferred to [facility] for drug withdrawal, mom requests to be discharged today

## 2020-12-12 NOTE — SUBJECTIVE & OBJECTIVE
Obstetric HPI:  Patient reports  Contractions started Thursday and started getting worse this am, active fetal movement, No vaginal bleeding , No loss of fluid     This pregnancy has been complicated by history of drug use, positive Hepatitis A and C and limited prenatal care    OB History    Para Term  AB Living   3 1 1 0 1 1   SAB TAB Ectopic Multiple Live Births   0 1 0 0 1      # Outcome Date GA Lbr Wei/2nd Weight Sex Delivery Anes PTL Lv   3 Current            2 Term 01/19/15   3.544 kg (7 lb 13 oz) F Vag-Spont   KAVIN   1 TAB              Past Medical History:   Diagnosis Date    Blood clot associated with vein wall inflammation 2018    from PICC line    Endocarditis 2018    HCV (hepatitis C virus)     IV drug abuse      Past Surgical History:   Procedure Laterality Date    CYSTOSCOPY W/ URETERAL STENT PLACEMENT Right 2019    Procedure: CYSTOSCOPY, WITH URETERAL STENT INSERTION;  Surgeon: Mark Rosales IV, MD;  Location: [facility] OR;  Service: Urology;  Laterality: Right;    CYSTOSCOPY W/ URETERAL STENT PLACEMENT Right 2019    Procedure: CYSTOSCOPY, WITH URETERAL STENT INSERTION;  Surgeon: Mark Rosales IV, MD;  Location: [facility] OR;  Service: Urology;  Laterality: Right;    CYSTOSCOPY W/ URETERAL STENT REMOVAL Right 2019    Procedure: CYSTOSCOPY, WITH URETERAL STENT REMOVAL;  Surgeon: Mark Rosales IV, MD;  Location: [facility] OR;  Service: Urology;  Laterality: Right;    INCISION AND DRAINAGE Right     arm    INDUCED       LASER LITHOTRIPSY Right 2019    Procedure: LITHOTRIPSY, USING LASER;  Surgeon: Mark Rosales IV, MD;  Location: [facility] OR;  Service: Urology;  Laterality: Right;    RETROGRADE PYELOGRAPHY N/A 2019    Procedure: PYELOGRAM, RETROGRADE;  Surgeon: Mark Rosales IV, MD;  Location: [facility] OR;  Service: Urology;  Laterality: N/A;       PTA Medications   Medication Sig    prenatal 25-iron-folate 6-dha 30 mg iron-1mg -200 mg Cap Take  "1 tablet by mouth once daily.    prenatal vit no.124-iron-folic (PRENATAL VITAMIN) 27 mg iron- 800 mcg Tab Take 1 tablet by mouth once daily.    terconazole (TERAZOL 7) 0.4 % Crea Place 1 applicator vaginally once daily.       Review of patient's allergies indicates:  No Known Allergies     Family History     Problem Relation (Age of Onset)    Heart disease Maternal Grandfather        Tobacco Use    Smoking status: Current Every Day Smoker    Smokeless tobacco: Never Used    Tobacco comment: no smoking after m.n prior to sx   Substance and Sexual Activity    Alcohol use: No    Drug use: Not Currently     Types: "Crack" cocaine, Methamphetamines     Comment: sober since 10/7/19    Sexual activity: Yes     Partners: Male     Review of Systems   Constitutional: Negative for activity change, appetite change and fever.   Eyes: Negative for visual disturbance.   Respiratory: Negative for cough.    Gastrointestinal: Negative for nausea and vomiting.   Genitourinary: Negative for vaginal bleeding and vaginal discharge.   Neurological: Negative for syncope and headaches.   Psychiatric/Behavioral: Negative for depression.      Objective:     Vital Signs (Most Recent):    Vital Signs (24h Range):           There is no height or weight on file to calculate BMI.    FHT: 145Cat 1 (reassuring)  TOCO:  Q 2-3 minutes    Physical Exam:   Constitutional: She is oriented to person, place, and time. She appears well-developed and well-nourished.    HENT:   Head: Normocephalic and atraumatic.    Eyes: EOM are normal.    Neck: Normal range of motion. Neck supple.     Pulmonary/Chest: Effort normal.        Abdominal: Soft.   gravid             Musculoskeletal: Normal range of motion and moves all extremeties.       Neurological: She is alert and oriented to person, place, and time.    Skin: Skin is warm and dry.    Psychiatric: She has a normal mood and affect. Her behavior is normal. Judgment and thought content normal. "       Cervix:  Dilation:  5  Effacement:  90  Station: -3  Presentation: Vertex     Significant Labs:  Lab Results   Component Value Date    GROUPTRH O POS 08/19/2020    HEPBSAG Negative 08/19/2020       I have personallly reviewed all pertinent lab results from the last 24 hours.

## 2020-12-12 NOTE — L&D DELIVERY NOTE
[facility] -   Vaginal Delivery   Operative Note    SUMMARY     Normal spontaneous vaginal delivery of live infant, skin to skin was unable to be performed due to meconium.  Infant delivered position OA over intact perineum.  Nuchal cord: Yes, cord reduced following delivery.    Spontaneous delivery of placenta and IV pitocin given noting good uterine tone.  bilateral labial laceration repaired with 2.0 vicryl.  Patient tolerated delivery well. Sponge needle and lap counted correctly x2.    Indications: Vaginal delivery  Pregnancy complicated by:   Patient Active Problem List   Diagnosis    Ureterolithiasis    Analgesic nephropathy    Metabolic acidosis    Encounter for medication review and counseling    Hydronephrosis    Ureteral stone    Fall    Viral hepatitis A without hepatic coma    Hepatitis C    Limited prenatal care in second trimester    History of substance abuse    Vaginal delivery    Single live birth    Obstetric labial laceration, delivered, current hospitalization     Admitting GA: 38w0d    Delivery Information for Dez Vázquez    Birth information:  YOB: 2020   Time of birth: 7:51 AM   Sex: male   Head Delivery Date/Time: 12/12/2020  7:51 AM   Delivery type: Vaginal, Spontaneous   Gestational Age: 38w0d    Delivery Providers    Delivering clinician: Yun Toro CNM   Provider Role    Agata Gary RN Registered Nurse    Sheri Benito RN Registered Nurse    Naz Davila, Patient Care Assistant             Measurements    Weight: 2930 g  Length: 51.5 cm  Head circumference: 32.5 cm  Chest circumference: 32 cm  Abdominal girth: 28.5 cm         Apgars    Living status: Living  Apgars:  1 min.:  5 min.:  10 min.:  15 min.:  20 min.:    Skin color:  0  1       Heart rate:  2  2       Reflex irritability:  1  2       Muscle tone:  2  2       Respiratory effort:  2  2       Total:  7  9       Apgars assigned by: INES BENIOT RN          Operative Delivery    Forceps attempted?: No  Vacuum extractor attempted?: No         Shoulder Dystocia    Shoulder dystocia present?: No           Presentation    Presentation: Vertex           Interventions/Resuscitation    Method: Bulb Suctioning, Tactile Stimulation, Deep Suctioning       Cord    Vessels: 3 vessels  Complications: Nuchal  Nuchal Intervention: reduced  Nuchal Cord Description: loose nuchal cord  Number of Loops: 1  Delayed Cord Clamping?: No  Cord Clamped Date/Time: 2020  7:51 AM  Cord Blood Disposition: Lab  Gases Sent?: No  Stem Cell Collection (by MD): No       Placenta    Placenta delivery date/time: 2020 0754  Placenta removal: Spontaneous  Placenta appearance: Intact  Placenta disposition: discarded           Labor Events:       labor: No     Labor Onset Date/Time:         Dilation Complete Date/Time: 2020 07:40     Start Pushing Date/Time: 2020 07:40       Start Pushing Date/Time: 2020 07:40     Rupture Date/Time: 20  0723         Rupture type:          Fluid Amount: Small      Fluid Color: Meconium Thick      Fluid Odor:       Membrane Status: SRM (Spontaneous Rupture)               steroids: None     Antibiotics given for GBS: No     Induction: none     Indications for induction:        Augmentation:       Indications for augmentation:       Labor complications: None     Additional complications:          Cervical ripening:                     Delivery:      Episiotomy: None     Indication for Episiotomy:       Perineal Lacerations: None Repaired:      Periurethral Laceration:   Repaired:     Labial Laceration: bilateral Repaired: Yes   Sulcus Laceration:   Repaired:     Vaginal Laceration:   Repaired:     Cervical Laceration:   Repaired:     Repair suture:       Repair # of packets: 2     Last Value - EBL - Nursing (mL): 0     Sum - EBL - Nursing (mL): 0     Last Value - EBL - Anesthesia (mL):      Calculated QBL (mL): 150       Vaginal Sweep Performed:       Surgicount Correct: Yes       Other providers:       Anesthesia    Method: Local          Details (if applicable):  Trial of Labor      Categorization:      Priority:     Indications for :     Incision Type:       Additional  information:  Forceps:    Vacuum:    Breech:    Observed anomalies    Other (Comments):

## 2020-12-12 NOTE — NURSING
Assisted pt to bathroom. Pt voided X 1. Charlene care instructions given. Pt verbalized and demonstrated understanding.

## 2020-12-12 NOTE — H&P
[facility]  Obstetrics  History & Physical    Patient Name: Neha Vázquez  MRN: [id number]  Admission Date: 2020  Primary Care Provider: To Obtain Unable    Subjective:     Principal Problem:Normal labor    History of Present Illness:  26 yo  38w from home with contractions    Obstetric HPI:  Patient reports  Contractions started Thursday and started getting worse this am, active fetal movement, No vaginal bleeding , No loss of fluid     This pregnancy has been complicated by history of drug use, positive Hepatitis A and C and limited prenatal care    OB History    Para Term  AB Living   3 1 1 0 1 1   SAB TAB Ectopic Multiple Live Births   0 1 0 0 1      # Outcome Date GA Lbr Wei/2nd Weight Sex Delivery Anes PTL Lv   3 Current            2 Term 01/19/15   3.544 kg (7 lb 13 oz) F Vag-Spont   KAVIN   1 TAB              Past Medical History:   Diagnosis Date    Blood clot associated with vein wall inflammation 2018    from PICC line    Endocarditis 2018    HCV (hepatitis C virus)     IV drug abuse      Past Surgical History:   Procedure Laterality Date    CYSTOSCOPY W/ URETERAL STENT PLACEMENT Right 2019    Procedure: CYSTOSCOPY, WITH URETERAL STENT INSERTION;  Surgeon: Mark Rosales IV, MD;  Location: [facility];  Service: Urology;  Laterality: Right;    CYSTOSCOPY W/ URETERAL STENT PLACEMENT Right 2019    Procedure: CYSTOSCOPY, WITH URETERAL STENT INSERTION;  Surgeon: Mark Rosales IV, MD;  Location: [facility] OR;  Service: Urology;  Laterality: Right;    CYSTOSCOPY W/ URETERAL STENT REMOVAL Right 2019    Procedure: CYSTOSCOPY, WITH URETERAL STENT REMOVAL;  Surgeon: Mark Rosales IV, MD;  Location: [facility] OR;  Service: Urology;  Laterality: Right;    INCISION AND DRAINAGE Right     arm    INDUCED   2015    LASER LITHOTRIPSY Right 2019    Procedure: LITHOTRIPSY, USING LASER;  Surgeon: Mark Rosales IV, MD;  Location: [facility];   "Service: Urology;  Laterality: Right;    RETROGRADE PYELOGRAPHY N/A 11/7/2019    Procedure: PYELOGRAM, RETROGRADE;  Surgeon: Mark Rosales IV, MD;  Location: [facility];  Service: Urology;  Laterality: N/A;       PTA Medications   Medication Sig    prenatal 25-iron-folate 6-dha 30 mg iron-1mg -200 mg Cap Take 1 tablet by mouth once daily.    prenatal vit no.124-iron-folic (PRENATAL VITAMIN) 27 mg iron- 800 mcg Tab Take 1 tablet by mouth once daily.    terconazole (TERAZOL 7) 0.4 % Crea Place 1 applicator vaginally once daily.       Review of patient's allergies indicates:  No Known Allergies     Family History     Problem Relation (Age of Onset)    Heart disease Maternal Grandfather        Tobacco Use    Smoking status: Current Every Day Smoker    Smokeless tobacco: Never Used    Tobacco comment: no smoking after m.n prior to sx   Substance and Sexual Activity    Alcohol use: No    Drug use: Not Currently     Types: "Crack" cocaine, Methamphetamines     Comment: sober since 10/7/19    Sexual activity: Yes     Partners: Male     Review of Systems   Constitutional: Negative for activity change, appetite change and fever.   Eyes: Negative for visual disturbance.   Respiratory: Negative for cough.    Gastrointestinal: Negative for nausea and vomiting.   Genitourinary: Negative for vaginal bleeding and vaginal discharge.   Neurological: Negative for syncope and headaches.   Psychiatric/Behavioral: Negative for depression.      Objective:     Vital Signs (Most Recent):    Vital Signs (24h Range):           There is no height or weight on file to calculate BMI.    FHT: 145Cat 1 (reassuring)  TOCO:  Q 2-3 minutes    Physical Exam:   Constitutional: She is oriented to person, place, and time. She appears well-developed and well-nourished.    HENT:   Head: Normocephalic and atraumatic.    Eyes: EOM are normal.    Neck: Normal range of motion. Neck supple.     Pulmonary/Chest: Effort normal.        Abdominal: Soft. "   gravid             Musculoskeletal: Normal range of motion and moves all extremeties.       Neurological: She is alert and oriented to person, place, and time.    Skin: Skin is warm and dry.    Psychiatric: She has a normal mood and affect. Her behavior is normal. Judgment and thought content normal.       Cervix:  Dilation:  5  Effacement:  90  Station: -3  Presentation: Vertex     Significant Labs:  Lab Results   Component Value Date    GROUPTRH O POS 2020    HEPBSAG Negative 2020       I have personallly reviewed all pertinent lab results from the last 24 hours.    Assessment/Plan:     25 y.o. female  at 38w0d for:    * Normal labor  Admit  Epidural  Anticipate vaginal delivery        Yun Toro CNM  Obstetrics  [facility]

## 2020-12-13 VITALS
SYSTOLIC BLOOD PRESSURE: 136 MMHG | RESPIRATION RATE: 18 BRPM | DIASTOLIC BLOOD PRESSURE: 81 MMHG | HEART RATE: 64 BPM | OXYGEN SATURATION: 99 % | TEMPERATURE: 98 F

## 2020-12-13 PROCEDURE — 99238 HOSP IP/OBS DSCHRG MGMT 30/<: CPT | Mod: ,,, | Performed by: MIDWIFE

## 2020-12-13 PROCEDURE — 99238 PR HOSPITAL DISCHARGE DAY,<30 MIN: ICD-10-PCS | Mod: ,,, | Performed by: MIDWIFE

## 2020-12-13 PROCEDURE — 25000003 PHARM REV CODE 250: Performed by: MIDWIFE

## 2020-12-13 RX ORDER — IBUPROFEN 600 MG/1
600 TABLET ORAL EVERY 6 HOURS PRN
Qty: 60 TABLET | Refills: 0 | Status: SHIPPED | OUTPATIENT
Start: 2020-12-13

## 2020-12-13 RX ADMIN — IBUPROFEN 600 MG: 600 TABLET, FILM COATED ORAL at 12:12

## 2020-12-13 RX ADMIN — PRENATAL VITAMINS-IRON FUMARATE 27 MG IRON-FOLIC ACID 0.8 MG TABLET 1 TABLET: at 08:12

## 2020-12-13 RX ADMIN — IBUPROFEN 600 MG: 600 TABLET, FILM COATED ORAL at 05:12

## 2020-12-13 NOTE — DISCHARGE INSTRUCTIONS

## 2020-12-13 NOTE — PROGRESS NOTES
25 y.o. female  at 37w5d  Reports + FM, denies VB, LOF or regular CTX  Doing well without concerns   Itching episode X 1, abd with visible scratches pt used a tooth pick  Off her suboxone, ready for baby  TW lbs   GBS collected today   Reviewed warning signs, normal FKCs, labor precautions and how/when to call.  RTC x 1 wk, call or present sooner prn.

## 2020-12-13 NOTE — PROGRESS NOTES
[facility] -   Obstetrics  Postpartum Progress Note    Patient Name: Neha Vázquez  MRN: [id number]  Admission Date: 2020  Hospital Length of Stay: 1 days  Attending Physician: Amy Short MD  Primary Care Provider: To Obtain Unable    Subjective:     Principal Problem:Vaginal delivery    Hospital Course:   38w Labor  Admit  Epidural  Anticipate vaginal delivery  2020-PPD #1, normal PP course, baby being transferred to [facility] for drug withdrawal, mom requests to be discharged today    Interval History:     She is doing well this morning. She is tolerating a regular diet without nausea or vomiting. She is voiding spontaneously. She is ambulating. She has passed flatus, and has not a BM. Vaginal bleeding is mild. She denies fever or chills. Abdominal pain is mild and controlled with oral medications. She is not breastfeeding. She desires circumcision for her male baby: yes.    Objective:     Vital Signs (Most Recent):  Temp: 98.1 °F (36.7 °C) (20 1226)  Pulse: 64 (20 1226)  Resp: 18 (20 1226)  BP: 136/81 (20 1226)  SpO2: 99 % (20 0413) Vital Signs (24h Range):  Temp:  [97.9 °F (36.6 °C)-98.5 °F (36.9 °C)] 98.1 °F (36.7 °C)  Pulse:  [56-77] 64  Resp:  [18] 18  SpO2:  [99 %] 99 %  BP: (112-136)/(56-85) 136/81        There is no height or weight on file to calculate BMI.    No intake or output data in the 24 hours ending 20 1258        Significant Labs:  Lab Results   Component Value Date    GROUPTRH O POS 2020    HEPBSAG Negative 2020    STREPBCULT Normal cervicovaginal melita present 12/10/2020     Recent Labs   Lab 20  0714   HGB 12.4   HCT 39.9       I have personallly reviewed all pertinent lab results from the last 24 hours.  Recent Lab Results     None          Physical Exam:   Constitutional: She is oriented to person, place, and time. She appears well-developed and well-nourished.    HENT:   Head: Normocephalic.      Neck: Normal range of motion. Neck supple.    Cardiovascular: Normal rate.     Pulmonary/Chest: Effort normal.        Abdominal: Soft.     Genitourinary:    Genitourinary Comments: lochia small/mod per pt   positive for vaginal discharge          Musculoskeletal: Normal range of motion and moves all extremeties.       Neurological: She is alert and oriented to person, place, and time.    Skin: Skin is warm and dry.    Psychiatric: She has a normal mood and affect. Her behavior is normal. Judgment and thought content normal.       Assessment/Plan:     25 y.o. female  for:    * Vaginal delivery  Routine PP care    Obstetric labial laceration, delivered, current hospitalization  Routine Perineal care    Single live birth  Infant being transferred to [facility] for drug withdrawal    History of substance abuse  UDS on admit positive for amphetamines, opiates, & cocaine        Disposition: As patient meets milestones, will plan to discharge today.    Pratik Salazar CNM  Obstetrics  [facility]

## 2020-12-13 NOTE — PLAN OF CARE
VSS. Pain well controlled with motrin and tylenol. Overnight, Mom continued to want to sleep. Continously called the nurses's station to have baby taken to nursery so she could sleep. Educated Mom on baby friendly policies. Educated Mom on avoiding co-sleeping with infant. Mom verbalized understanding

## 2020-12-13 NOTE — SUBJECTIVE & OBJECTIVE
Interval History:     She is doing well this morning. She is tolerating a regular diet without nausea or vomiting. She is voiding spontaneously. She is ambulating. She has passed flatus, and has not a BM. Vaginal bleeding is mild. She denies fever or chills. Abdominal pain is mild and controlled with oral medications. She is not breastfeeding. She desires circumcision for her male baby: yes.    Objective:     Vital Signs (Most Recent):  Temp: 98.1 °F (36.7 °C) (12/13/20 1226)  Pulse: 64 (12/13/20 1226)  Resp: 18 (12/13/20 1226)  BP: 136/81 (12/13/20 1226)  SpO2: 99 % (12/13/20 0413) Vital Signs (24h Range):  Temp:  [97.9 °F (36.6 °C)-98.5 °F (36.9 °C)] 98.1 °F (36.7 °C)  Pulse:  [56-77] 64  Resp:  [18] 18  SpO2:  [99 %] 99 %  BP: (112-136)/(56-85) 136/81        There is no height or weight on file to calculate BMI.    No intake or output data in the 24 hours ending 12/13/20 1258        Significant Labs:  Lab Results   Component Value Date    GROUPTRH O POS 12/12/2020    HEPBSAG Negative 08/19/2020    STREPBCULT Normal cervicovaginal melita present 12/10/2020     Recent Labs   Lab 12/12/20  0714   HGB 12.4   HCT 39.9       I have personallly reviewed all pertinent lab results from the last 24 hours.  Recent Lab Results     None          Physical Exam:   Constitutional: She is oriented to person, place, and time. She appears well-developed and well-nourished.    HENT:   Head: Normocephalic.     Neck: Normal range of motion. Neck supple.    Cardiovascular: Normal rate.     Pulmonary/Chest: Effort normal.        Abdominal: Soft.     Genitourinary:    Genitourinary Comments: lochia small/mod per pt   positive for vaginal discharge          Musculoskeletal: Normal range of motion and moves all extremeties.       Neurological: She is alert and oriented to person, place, and time.    Skin: Skin is warm and dry.    Psychiatric: She has a normal mood and affect. Her behavior is normal. Judgment and thought content normal.

## 2020-12-13 NOTE — NURSING
Patient discharged due to infant being transferred to [facility] for drug withdrawal treatment. Patient is stable. VSS.

## 2020-12-13 NOTE — NURSING
Educated Mom and Dad on risks of co-sleeping with infant. Upon entry to room, infant was sleeping between Mom and Dad. Infant placed back in crib at this time. Educated Mom and Dad that infant has tremors due to withdrawal, not because the baby is cold.

## 2020-12-13 NOTE — DISCHARGE SUMMARY
[facility] -   Obstetrics  Discharge Summary      Patient Name: Neha Vázquez  MRN: [id number]  Admission Date: 2020  Hospital Length of Stay: 1 days  Discharge Date and Time:  2020 1:07 PM  Attending Physician: Amy Short MD   Discharging Provider: Pratik Salazar CNM   Primary Care Provider: To Obtain Unable    HPI: 26 yo  38w from home with contractions        * No surgery found *     Hospital Course:    38w Labor  Admit  Epidural  Anticipate vaginal delivery  2020-PPD #1, normal PP course, baby being transferred to [facility] for drug withdrawal, mom requests to be discharged today         Final Active Diagnoses:    Diagnosis Date Noted POA    PRINCIPAL PROBLEM:  Vaginal delivery [O80] 2020 Not Applicable    Single live birth [Z37.0] 2020 Not Applicable    Obstetric labial laceration, delivered, current hospitalization [O70.0] 2020 No    History of substance abuse [F19.11] 11/10/2020 Yes      Problems Resolved During this Admission:    Diagnosis Date Noted Date Resolved POA    Normal labor [O80, Z37.9] 2020 Not Applicable        Significant Diagnostic Studies: Labs: All labs within the past 24 hours have been reviewed      Feeding Method: bottle    Immunizations     None          Delivery:    Episiotomy: None   Lacerations: None   Repair suture:     Repair # of packets: 2   Blood loss (ml): 0     Birth information:  YOB: 2020   Time of birth: 7:51 AM   Sex: male   Delivery type: Vaginal, Spontaneous   Gestational Age: 38w0d    Delivery Clinician:      Other providers:       Additional  information:  Forceps:    Vacuum:    Breech:    Observed anomalies      Living?:           APGARS  One minute Five minutes Ten minutes   Skin color:         Heart rate:         Grimace:         Muscle tone:         Breathing:         Totals: 7  9        Placenta: Delivered:       appearance    Pending Diagnostic Studies:      None          Discharged Condition: stable    Disposition: Home or Self Care    Follow Up:  Follow-up Information     Saida Figueredo CNM In 4 weeks.    Specialty: Obstetrics  Why: PP follow up  Contact information:  [address]  [place] [address] [phone]                 Patient Instructions:      Diet Adult Regular     No driving until:     Pelvic Rest   Order Comments: 1-2 wks     Notify your health care provider if you experience any of the following:  temperature >100.4     Notify your health care provider if you experience any of the following:  severe uncontrolled pain     Notify your health care provider if you experience any of the following:  redness, tenderness, or signs of infection (pain, swelling, redness, odor or green/yellow discharge around incision site)     Notify your health care provider if you experience any of the following:  severe persistent headache     Notify your health care provider if you experience any of the following:  persistent dizziness, light-headedness, or visual disturbances     Medications:  Current Discharge Medication List      START taking these medications    Details   ibuprofen (ADVIL,MOTRIN) 600 MG tablet Take 1 tablet (600 mg total) by mouth every 6 (six) hours as needed for Pain.  Qty: 60 tablet, Refills: 0         CONTINUE these medications which have NOT CHANGED    Details   prenatal vit no.124-iron-folic (PRENATAL VITAMIN) 27 mg iron- 800 mcg Tab Take 1 tablet by mouth once daily.  Qty: 30 tablet, Refills: 6    Comments: ANY PRENATAL VITAMIN COVERED BY THE PATIENT'S INSURANCE IS OKAY  Associated Diagnoses: Normal pregnancy in third trimester         STOP taking these medications       prenatal 25-iron-folate 6-dha 30 mg iron-1mg -200 mg Cap Comments:   Reason for Stopping:         terconazole (TERAZOL 7) 0.4 % Crea Comments:   Reason for Stopping:               Pratik Salazar CNM  Obstetrics  [facility]

## 2020-12-14 LAB — BACTERIA SPEC AEROBE CULT: NORMAL

## 2021-09-11 ENCOUNTER — HOSPITAL ENCOUNTER (EMERGENCY)
Facility: HOSPITAL | Age: 26
Discharge: HOME | End: 2021-09-11
Attending: EMERGENCY MEDICINE
Payer: MEDICAID

## 2021-09-11 VITALS
WEIGHT: 212.75 LBS | TEMPERATURE: 98 F | SYSTOLIC BLOOD PRESSURE: 114 MMHG | DIASTOLIC BLOOD PRESSURE: 59 MMHG | OXYGEN SATURATION: 97 % | HEART RATE: 66 BPM | HEIGHT: 65 IN | RESPIRATION RATE: 18 BRPM | BODY MASS INDEX: 35.45 KG/M2

## 2021-09-11 DIAGNOSIS — N30.01 ACUTE CYSTITIS WITH HEMATURIA: ICD-10-CM

## 2021-09-11 DIAGNOSIS — R50.9 FEVER: ICD-10-CM

## 2021-09-11 DIAGNOSIS — R50.9 FEVER, UNSPECIFIED FEVER CAUSE: Primary | ICD-10-CM

## 2021-09-11 LAB
ALBUMIN SERPL BCP-MCNC: 3.4 G/DL (ref 3.5–5.2)
ALP SERPL-CCNC: 338 U/L (ref 55–135)
ALT SERPL W/O P-5'-P-CCNC: 474 U/L (ref 10–44)
ANION GAP SERPL CALC-SCNC: 14 MMOL/L (ref 8–16)
AST SERPL-CCNC: 372 U/L (ref 10–40)
BACTERIA #/AREA URNS HPF: ABNORMAL /HPF
BASOPHILS # BLD AUTO: 0.07 K/UL (ref 0–0.2)
BASOPHILS NFR BLD: 2.1 % (ref 0–1.9)
BILIRUB SERPL-MCNC: 2.8 MG/DL (ref 0.1–1)
BILIRUB UR QL STRIP: ABNORMAL
BUN SERPL-MCNC: 8 MG/DL (ref 6–20)
CALCIUM SERPL-MCNC: 9.9 MG/DL (ref 8.7–10.5)
CHLORIDE SERPL-SCNC: 102 MMOL/L (ref 95–110)
CLARITY UR: CLEAR
CO2 SERPL-SCNC: 21 MMOL/L (ref 23–29)
COLOR UR: YELLOW
CREAT SERPL-MCNC: 0.8 MG/DL (ref 0.5–1.4)
CTP QC/QA: YES
CTP QC/QA: YES
DIFFERENTIAL METHOD: ABNORMAL
EOSINOPHIL # BLD AUTO: 0.4 K/UL (ref 0–0.5)
EOSINOPHIL NFR BLD: 10.6 % (ref 0–8)
ERYTHROCYTE [DISTWIDTH] IN BLOOD BY AUTOMATED COUNT: 16.2 % (ref 11.5–14.5)
EST. GFR  (AFRICAN AMERICAN): >60 ML/MIN/1.73 M^2
EST. GFR  (NON AFRICAN AMERICAN): >60 ML/MIN/1.73 M^2
GLUCOSE SERPL-MCNC: 88 MG/DL (ref 70–110)
GLUCOSE UR QL STRIP: NEGATIVE
HCT VFR BLD AUTO: 40.7 % (ref 37–48.5)
HGB BLD-MCNC: 12.9 G/DL (ref 12–16)
HGB UR QL STRIP: NEGATIVE
HYALINE CASTS #/AREA URNS LPF: 0 /LPF
IMM GRANULOCYTES # BLD AUTO: 0.04 K/UL (ref 0–0.04)
IMM GRANULOCYTES NFR BLD AUTO: 1.2 % (ref 0–0.5)
KETONES UR QL STRIP: ABNORMAL
LACTATE SERPL-SCNC: 1.5 MMOL/L (ref 0.5–2.2)
LEUKOCYTE ESTERASE UR QL STRIP: ABNORMAL
LYMPHOCYTES # BLD AUTO: 0.5 K/UL (ref 1–4.8)
LYMPHOCYTES NFR BLD: 15 % (ref 18–48)
MCH RBC QN AUTO: 25.4 PG (ref 27–31)
MCHC RBC AUTO-ENTMCNC: 31.7 G/DL (ref 32–36)
MCV RBC AUTO: 80 FL (ref 82–98)
MICROSCOPIC COMMENT: ABNORMAL
MONOCYTES # BLD AUTO: 0.3 K/UL (ref 0.3–1)
MONOCYTES NFR BLD: 8.6 % (ref 4–15)
NEUTROPHILS # BLD AUTO: 2.1 K/UL (ref 1.8–7.7)
NEUTROPHILS NFR BLD: 62.5 % (ref 38–73)
NITRITE UR QL STRIP: POSITIVE
NRBC BLD-RTO: 0 /100 WBC
PH UR STRIP: 7 [PH] (ref 5–8)
PLATELET # BLD AUTO: 155 K/UL (ref 150–450)
PMV BLD AUTO: 9.1 FL (ref 9.2–12.9)
POC MOLECULAR INFLUENZA A AGN: NEGATIVE
POC MOLECULAR INFLUENZA B AGN: NEGATIVE
POTASSIUM SERPL-SCNC: 4.2 MMOL/L (ref 3.5–5.1)
PROT SERPL-MCNC: 7.8 G/DL (ref 6–8.4)
PROT UR QL STRIP: ABNORMAL
RBC # BLD AUTO: 5.07 M/UL (ref 4–5.4)
RBC #/AREA URNS HPF: 3 /HPF (ref 0–4)
SARS-COV-2 RDRP RESP QL NAA+PROBE: NEGATIVE
SODIUM SERPL-SCNC: 137 MMOL/L (ref 136–145)
SP GR UR STRIP: 1.02 (ref 1–1.03)
SQUAMOUS #/AREA URNS HPF: 12 /HPF
URN SPEC COLLECT METH UR: ABNORMAL
UROBILINOGEN UR STRIP-ACNC: ABNORMAL EU/DL
WBC # BLD AUTO: 3.39 K/UL (ref 3.9–12.7)
WBC #/AREA URNS HPF: 10 /HPF (ref 0–5)

## 2021-09-11 PROCEDURE — 85025 COMPLETE CBC W/AUTO DIFF WBC: CPT | Performed by: NURSE PRACTITIONER

## 2021-09-11 PROCEDURE — 87040 BLOOD CULTURE FOR BACTERIA: CPT | Performed by: NURSE PRACTITIONER

## 2021-09-11 PROCEDURE — 96365 THER/PROPH/DIAG IV INF INIT: CPT

## 2021-09-11 PROCEDURE — 83605 ASSAY OF LACTIC ACID: CPT | Performed by: NURSE PRACTITIONER

## 2021-09-11 PROCEDURE — 81000 URINALYSIS NONAUTO W/SCOPE: CPT | Performed by: NURSE PRACTITIONER

## 2021-09-11 PROCEDURE — U0002 COVID-19 LAB TEST NON-CDC: HCPCS | Performed by: NURSE PRACTITIONER

## 2021-09-11 PROCEDURE — 99284 EMERGENCY DEPT VISIT MOD MDM: CPT | Mod: 25

## 2021-09-11 PROCEDURE — 80053 COMPREHEN METABOLIC PANEL: CPT | Performed by: NURSE PRACTITIONER

## 2021-09-11 PROCEDURE — 63600175 PHARM REV CODE 636 W HCPCS: Performed by: EMERGENCY MEDICINE

## 2021-09-11 RX ORDER — CEFUROXIME AXETIL 500 MG/1
500 TABLET ORAL 2 TIMES DAILY
Qty: 20 TABLET | Refills: 0 | Status: SHIPPED | OUTPATIENT
Start: 2021-09-11 | End: 2021-09-21

## 2021-09-11 RX ORDER — CEFEPIME HYDROCHLORIDE 1 G/50ML
1 INJECTION, SOLUTION INTRAVENOUS
Status: COMPLETED | OUTPATIENT
Start: 2021-09-11 | End: 2021-09-11

## 2021-09-11 RX ADMIN — CEFEPIME HYDROCHLORIDE 1 G: 1 INJECTION, SOLUTION INTRAVENOUS at 02:09

## 2021-09-17 LAB
BACTERIA BLD CULT: NORMAL
BACTERIA BLD CULT: NORMAL

## 2022-08-07 ENCOUNTER — HOSPITAL ENCOUNTER (EMERGENCY)
Facility: HOSPITAL | Age: 27
Discharge: HOME | End: 2022-08-07
Attending: EMERGENCY MEDICINE
Payer: MEDICAID

## 2022-08-07 VITALS
WEIGHT: 194.44 LBS | BODY MASS INDEX: 32.4 KG/M2 | RESPIRATION RATE: 16 BRPM | TEMPERATURE: 98 F | HEIGHT: 65 IN | DIASTOLIC BLOOD PRESSURE: 85 MMHG | OXYGEN SATURATION: 97 % | HEART RATE: 102 BPM | SYSTOLIC BLOOD PRESSURE: 142 MMHG

## 2022-08-07 DIAGNOSIS — L03.115 CELLULITIS OF RIGHT LOWER EXTREMITY: Primary | ICD-10-CM

## 2022-08-07 PROCEDURE — 99283 EMERGENCY DEPT VISIT LOW MDM: CPT

## 2022-08-07 RX ORDER — SULFAMETHOXAZOLE AND TRIMETHOPRIM 800; 160 MG/1; MG/1
1 TABLET ORAL 2 TIMES DAILY
Qty: 14 TABLET | Refills: 0 | Status: SHIPPED | OUTPATIENT
Start: 2022-08-07 | End: 2022-08-14

## 2022-08-07 NOTE — ED PROVIDER NOTES
History      Chief Complaint   Patient presents with    Abscess     Rigth inner thigh abscess, was drained [facility] hours yesterday and started on ABX, wants to have it checked.       Review of patient's allergies indicates:  No Known Allergies     HPI   HPI    2022, 1:25 PM   History obtained from the patient      History of Present Illness: Neha Vázquez is a 26 y.o. female patient who presents to the Emergency Department for wound check of lesion to right thigh.  She says she was prescribed Keflex at urgent care yesterday and it seems to be improving. Symptoms are moderate in severity.     No further complaints or concerns at this time.           PCP: To Obtain Unable       Past Medical History:  Past Medical History:   Diagnosis Date    Blood clot associated with vein wall inflammation 2018    from PICC line    Endocarditis     HCV (hepatitis C virus)     IV drug abuse          Past Surgical History:  Past Surgical History:   Procedure Laterality Date    CYSTOSCOPY W/ URETERAL STENT PLACEMENT Right 2019    Procedure: CYSTOSCOPY, WITH URETERAL STENT INSERTION;  Surgeon: Mark Rosales IV, MD;  Location: [facility] OR;  Service: Urology;  Laterality: Right;    CYSTOSCOPY W/ URETERAL STENT PLACEMENT Right 2019    Procedure: CYSTOSCOPY, WITH URETERAL STENT INSERTION;  Surgeon: Mark Rosales IV, MD;  Location: [facility] OR;  Service: Urology;  Laterality: Right;    CYSTOSCOPY W/ URETERAL STENT REMOVAL Right 2019    Procedure: CYSTOSCOPY, WITH URETERAL STENT REMOVAL;  Surgeon: Mark Rosales IV, MD;  Location: [facility] OR;  Service: Urology;  Laterality: Right;    INCISION AND DRAINAGE Right     arm    INDUCED   2015    LASER LITHOTRIPSY Right 2019    Procedure: LITHOTRIPSY, USING LASER;  Surgeon: Mark Rosales IV, MD;  Location: [facility] OR;  Service: Urology;  Laterality: Right;    RETROGRADE PYELOGRAPHY N/A 2019    Procedure: PYELOGRAM, RETROGRADE;  " Surgeon: Mark Rosales IV, MD;  Location: [facility];  Service: Urology;  Laterality: N/A;           Family History:  Family History   Problem Relation Age of Onset    Heart disease Maternal Grandfather            Social History:  Social History     Tobacco Use    Smoking status: Current Every Day Smoker    Smokeless tobacco: Never Used    Tobacco comment: no smoking after m.n prior to sx   Substance and Sexual Activity    Alcohol use: No    Drug use: Not Currently     Types: "Crack" cocaine, Methamphetamines     Comment: sober since 10/7/19    Sexual activity: Yes     Partners: Male       ROS     Review of Systems   Constitutional: Negative for chills and fever.   HENT: Negative for facial swelling and trouble swallowing.    Eyes: Negative for discharge, redness and visual disturbance.   Respiratory: Negative for chest tightness and shortness of breath.    Cardiovascular: Negative for chest pain and leg swelling.   Gastrointestinal: Negative for diarrhea and vomiting.   Genitourinary: Negative for decreased urine volume and dysuria.   Musculoskeletal: Negative for joint swelling and neck stiffness.   Skin: Negative for rash and wound.   Neurological: Negative for syncope and facial asymmetry.   All other systems reviewed and are negative.      Physical Exam      Initial Vitals [08/07/22 1258]   BP Pulse Resp Temp SpO2   (!) 142/85 102 16 98.2 °F (36.8 °C) 97 %      MAP       --         Physical Exam  Vital signs and nursing notes reviewed.  Constitutional: Patient is in NAD. Awake and alert. Well-developed and well-nourished.  Head: Atraumatic. Normocephalic.  Eyes: PERRL. EOM intact. Conjunctivae nl. No scleral icterus.  ENT: Mucous membranes are moist. Oropharynx is clear.  Neck: Supple. No JVD. No lymphadenopathy.  No meningismus  Cardiovascular: Regular rate and rhythm. No murmurs, rubs, or gallops. Distal pulses are 2+ and symmetric.  Pulmonary/Chest: No respiratory distress. Clear to auscultation " "bilaterally. No wheezing, rales, or rhonchi.  Abdominal: Soft. Non-distended. No TTP. No rebound, guarding, or rigidity. Good bowel sounds.  Genitourinary: No CVA tenderness  Musculoskeletal: Moves all extremities. No edema.   Skin: Warm and dry.  Right thigh with eschar and mild surrounding erythema.  No fluctuance  Neurological: Awake and alert. No acute focal neurological deficits are appreciated.  Psychiatric: Normal affect. Good eye contact. Appropriate in content.      ED Course          Procedures  ED Vital Signs:  Vitals:    08/07/22 1258   BP: (!) 142/85   Pulse: 102   Resp: 16   Temp: 98.2 °F (36.8 °C)   TempSrc: Oral   SpO2: 97%   Weight: 88.2 kg (194 lb 7.1 oz)   Height: 5' 5" (1.651 m)                 Imaging Results:  Imaging Results    None            The Emergency Provider reviewed the vital signs and test results, which are outlined above.    ED Discussion             Medication(s) given in the ER:  Medications - No data to display         Follow-up Information     [facility] - Emergency Dept..    Specialty: Emergency Medicine  Why: If symptoms worsen  Contact information:  [address] [facility] [address] [phone]           Your Primary Care Doctor In 2 days.                              Medication List      START taking these medications    sulfamethoxazole-trimethoprim 800-160mg 800-160 mg Tab  Commonly known as: BACTRIM DS  Take 1 tablet by mouth 2 (two) times daily. for 7 days        ASK your doctor about these medications    ibuprofen 600 MG tablet  Commonly known as: ADVIL,MOTRIN  Take 1 tablet (600 mg total) by mouth every 6 (six) hours as needed for Pain.     PRENATAL VITAMIN 27 mg iron- 800 mcg Tab  Generic drug: prenatal vit no.124-iron-folic  Take 1 tablet by mouth once daily.           Where to Get Your Medications      These medications were sent to [id number] - [place], [place] - [address] AT [facility] [address] [place] [address], [facility] [address]    Phone: [phone]   · sulfamethoxazole-trimethoprim 800-160mg 800-160 mg Tab             Medical Decision Making        All findings were reviewed with the patient/family in detail.   All remaining questions and concerns were addressed at that time.  Patient/family has been counseled regarding the need for follow-up as well as the indication to return to the emergency room should new or worrisome developments occur.        MDM               Clinical Impression:        ICD-10-CM ICD-9-CM   1. Cellulitis of right lower extremity  L03.115 682.6               Delfina Ingram PA-C  08/08/22 [id number]

## 2023-12-05 NOTE — ASSESSMENT & PLAN NOTE
- WBC 12.79 in ED.   At [facility] on 11/3 WBC = 12.8  - U/A indicates RBC and + 1 leukocytes  - Given empiric Rocephin and Rocephin restarted when pt spiked a temp  - UC is no growth  - BC show NGTD  - Daily CBC  Spiked a temp this AM of 101.4 - restarted Rocephin, urine culture negative. Urology to place ureteral stent     Has appt 12-14-23.   Josefina Ramos PA-C

## (undated) DEVICE — SEE MEDLINE ITEM 157185

## (undated) DEVICE — FIBER HOLM LSR SMARTSYNC R 550

## (undated) DEVICE — UROVIEW 2600/2800

## (undated) DEVICE — SEE MEDLINE ITEM 152622

## (undated) DEVICE — BASKET STNE RTRVL HLC 3F 4WR O

## (undated) DEVICE — SOL IRR NACL .9% 3000ML

## (undated) DEVICE — SEE MEDLINE ITEM 152487

## (undated) DEVICE — GOWN SMARTGOWN LVL4 X-LONG XL

## (undated) DEVICE — GAUZE SPONGE 4X4 12PLY

## (undated) DEVICE — SEE MEDLINE ITEM 157117

## (undated) DEVICE — ADHESIVE MASTISOL VIAL 48/BX

## (undated) DEVICE — WIRE GD LUB ANG 3CM .038 150CM

## (undated) DEVICE — WIRE GUIDE 0.038OLD

## (undated) DEVICE — ADAPTER TUOHY BORST 6FR

## (undated) DEVICE — SET IRRIGATION WARMING NORMAL

## (undated) DEVICE — SET IRR URLGY 2LINE UNIV SPIKE

## (undated) DEVICE — CATH 5FR OPEN END URETERAL

## (undated) DEVICE — SYR ONLY LUER LOCK 20CC

## (undated) DEVICE — SOL WATER STRL IRR 1000ML

## (undated) DEVICE — CONTAINER SPECIMEN STRL 4OZ

## (undated) DEVICE — SKIN MARKER DEVON 160

## (undated) DEVICE — UNDERGLOVES BIOGEL PI SIZE 8

## (undated) DEVICE — GLOVE SURGICAL LATEX SZ 8

## (undated) DEVICE — SEE MEDLINE ITEM 154981

## (undated) DEVICE — CLOSURE SKIN STERI STRIP 1/2X4